# Patient Record
Sex: MALE | Race: WHITE | NOT HISPANIC OR LATINO | Employment: OTHER | URBAN - METROPOLITAN AREA
[De-identification: names, ages, dates, MRNs, and addresses within clinical notes are randomized per-mention and may not be internally consistent; named-entity substitution may affect disease eponyms.]

---

## 2021-08-17 ENCOUNTER — APPOINTMENT (OUTPATIENT)
Dept: RADIOLOGY | Facility: MEDICAL CENTER | Age: 76
DRG: 066 | End: 2021-08-17
Attending: EMERGENCY MEDICINE
Payer: COMMERCIAL

## 2021-08-17 ENCOUNTER — APPOINTMENT (OUTPATIENT)
Dept: RADIOLOGY | Facility: MEDICAL CENTER | Age: 76
DRG: 066 | End: 2021-08-17
Attending: HOSPITALIST
Payer: COMMERCIAL

## 2021-08-17 ENCOUNTER — HOSPITAL ENCOUNTER (INPATIENT)
Facility: MEDICAL CENTER | Age: 76
LOS: 2 days | DRG: 066 | End: 2021-08-19
Attending: EMERGENCY MEDICINE | Admitting: HOSPITALIST
Payer: COMMERCIAL

## 2021-08-17 DIAGNOSIS — I10 HYPERTENSION, UNSPECIFIED TYPE: ICD-10-CM

## 2021-08-17 DIAGNOSIS — I25.10 CORONARY ARTERY DISEASE INVOLVING NATIVE HEART, UNSPECIFIED VESSEL OR LESION TYPE, UNSPECIFIED WHETHER ANGINA PRESENT: ICD-10-CM

## 2021-08-17 DIAGNOSIS — I63.9 ACUTE CVA (CEREBROVASCULAR ACCIDENT) (HCC): ICD-10-CM

## 2021-08-17 PROBLEM — I73.9 PAD (PERIPHERAL ARTERY DISEASE) (HCC): Status: ACTIVE | Noted: 2021-08-17

## 2021-08-17 PROBLEM — E78.5 DYSLIPIDEMIA: Status: ACTIVE | Noted: 2021-08-17

## 2021-08-17 PROBLEM — R13.19 OTHER DYSPHAGIA: Status: ACTIVE | Noted: 2021-08-17

## 2021-08-17 PROBLEM — I65.21 STENOSIS OF RIGHT CAROTID ARTERY: Status: ACTIVE | Noted: 2021-08-17

## 2021-08-17 LAB
ABO + RH BLD: NORMAL
ABO GROUP BLD: NORMAL
ALBUMIN SERPL BCP-MCNC: 4.5 G/DL (ref 3.2–4.9)
ALBUMIN/GLOB SERPL: 1.9 G/DL
ALP SERPL-CCNC: 93 U/L (ref 30–99)
ALT SERPL-CCNC: 29 U/L (ref 2–50)
ANION GAP SERPL CALC-SCNC: 12 MMOL/L (ref 7–16)
APTT PPP: 34.8 SEC (ref 24.7–36)
AST SERPL-CCNC: 27 U/L (ref 12–45)
BASOPHILS # BLD AUTO: 0.3 % (ref 0–1.8)
BASOPHILS # BLD: 0.03 K/UL (ref 0–0.12)
BILIRUB SERPL-MCNC: 0.9 MG/DL (ref 0.1–1.5)
BLD GP AB SCN SERPL QL: NORMAL
BUN SERPL-MCNC: 11 MG/DL (ref 8–22)
CALCIUM SERPL-MCNC: 9.5 MG/DL (ref 8.5–10.5)
CHLORIDE SERPL-SCNC: 102 MMOL/L (ref 96–112)
CO2 SERPL-SCNC: 22 MMOL/L (ref 20–33)
CREAT SERPL-MCNC: 0.59 MG/DL (ref 0.5–1.4)
EKG IMPRESSION: NORMAL
EOSINOPHIL # BLD AUTO: 0.06 K/UL (ref 0–0.51)
EOSINOPHIL NFR BLD: 0.7 % (ref 0–6.9)
ERYTHROCYTE [DISTWIDTH] IN BLOOD BY AUTOMATED COUNT: 41.7 FL (ref 35.9–50)
EST. AVERAGE GLUCOSE BLD GHB EST-MCNC: 108 MG/DL
GLOBULIN SER CALC-MCNC: 2.4 G/DL (ref 1.9–3.5)
GLUCOSE SERPL-MCNC: 116 MG/DL (ref 65–99)
HBA1C MFR BLD: 5.4 % (ref 4–5.6)
HCT VFR BLD AUTO: 43.4 % (ref 42–52)
HGB BLD-MCNC: 15.4 G/DL (ref 14–18)
IMM GRANULOCYTES # BLD AUTO: 0.04 K/UL (ref 0–0.11)
IMM GRANULOCYTES NFR BLD AUTO: 0.5 % (ref 0–0.9)
INR PPP: 1.08 (ref 0.87–1.13)
LYMPHOCYTES # BLD AUTO: 1.79 K/UL (ref 1–4.8)
LYMPHOCYTES NFR BLD: 20.3 % (ref 22–41)
MAGNESIUM SERPL-MCNC: 1.8 MG/DL (ref 1.5–2.5)
MCH RBC QN AUTO: 31.8 PG (ref 27–33)
MCHC RBC AUTO-ENTMCNC: 35.5 G/DL (ref 33.7–35.3)
MCV RBC AUTO: 89.5 FL (ref 81.4–97.8)
MONOCYTES # BLD AUTO: 0.78 K/UL (ref 0–0.85)
MONOCYTES NFR BLD AUTO: 8.8 % (ref 0–13.4)
NEUTROPHILS # BLD AUTO: 6.13 K/UL (ref 1.82–7.42)
NEUTROPHILS NFR BLD: 69.4 % (ref 44–72)
NRBC # BLD AUTO: 0 K/UL
NRBC BLD-RTO: 0 /100 WBC
PLATELET # BLD AUTO: 210 K/UL (ref 164–446)
PMV BLD AUTO: 9.4 FL (ref 9–12.9)
POTASSIUM SERPL-SCNC: 4.2 MMOL/L (ref 3.6–5.5)
PROT SERPL-MCNC: 6.9 G/DL (ref 6–8.2)
PROTHROMBIN TIME: 13.7 SEC (ref 12–14.6)
RBC # BLD AUTO: 4.85 M/UL (ref 4.7–6.1)
RH BLD: NORMAL
SODIUM SERPL-SCNC: 136 MMOL/L (ref 135–145)
TROPONIN T SERPL-MCNC: 14 NG/L (ref 6–19)
WBC # BLD AUTO: 8.8 K/UL (ref 4.8–10.8)

## 2021-08-17 PROCEDURE — 70450 CT HEAD/BRAIN W/O DYE: CPT

## 2021-08-17 PROCEDURE — 86850 RBC ANTIBODY SCREEN: CPT

## 2021-08-17 PROCEDURE — 86900 BLOOD TYPING SEROLOGIC ABO: CPT

## 2021-08-17 PROCEDURE — 700105 HCHG RX REV CODE 258: Performed by: HOSPITALIST

## 2021-08-17 PROCEDURE — 83735 ASSAY OF MAGNESIUM: CPT

## 2021-08-17 PROCEDURE — 70551 MRI BRAIN STEM W/O DYE: CPT

## 2021-08-17 PROCEDURE — 84484 ASSAY OF TROPONIN QUANT: CPT

## 2021-08-17 PROCEDURE — 93005 ELECTROCARDIOGRAM TRACING: CPT | Performed by: EMERGENCY MEDICINE

## 2021-08-17 PROCEDURE — 700102 HCHG RX REV CODE 250 W/ 637 OVERRIDE(OP): Performed by: HOSPITALIST

## 2021-08-17 PROCEDURE — 85730 THROMBOPLASTIN TIME PARTIAL: CPT

## 2021-08-17 PROCEDURE — 86901 BLOOD TYPING SEROLOGIC RH(D): CPT

## 2021-08-17 PROCEDURE — 83036 HEMOGLOBIN GLYCOSYLATED A1C: CPT

## 2021-08-17 PROCEDURE — A9270 NON-COVERED ITEM OR SERVICE: HCPCS | Performed by: HOSPITALIST

## 2021-08-17 PROCEDURE — 99285 EMERGENCY DEPT VISIT HI MDM: CPT

## 2021-08-17 PROCEDURE — 700117 HCHG RX CONTRAST REV CODE 255: Performed by: EMERGENCY MEDICINE

## 2021-08-17 PROCEDURE — 70498 CT ANGIOGRAPHY NECK: CPT

## 2021-08-17 PROCEDURE — 99223 1ST HOSP IP/OBS HIGH 75: CPT | Performed by: PSYCHIATRY & NEUROLOGY

## 2021-08-17 PROCEDURE — 99223 1ST HOSP IP/OBS HIGH 75: CPT | Mod: AI | Performed by: HOSPITALIST

## 2021-08-17 PROCEDURE — 36415 COLL VENOUS BLD VENIPUNCTURE: CPT

## 2021-08-17 PROCEDURE — 80053 COMPREHEN METABOLIC PANEL: CPT

## 2021-08-17 PROCEDURE — 85610 PROTHROMBIN TIME: CPT

## 2021-08-17 PROCEDURE — 0042T CT-CEREBRAL PERFUSION ANALYSIS: CPT

## 2021-08-17 PROCEDURE — 70496 CT ANGIOGRAPHY HEAD: CPT

## 2021-08-17 PROCEDURE — 71045 X-RAY EXAM CHEST 1 VIEW: CPT

## 2021-08-17 PROCEDURE — 770020 HCHG ROOM/CARE - TELE (206)

## 2021-08-17 PROCEDURE — 85025 COMPLETE CBC W/AUTO DIFF WBC: CPT

## 2021-08-17 RX ORDER — ALBUTEROL SULFATE 90 UG/1
2 AEROSOL, METERED RESPIRATORY (INHALATION) EVERY 6 HOURS PRN
COMMUNITY

## 2021-08-17 RX ORDER — CLOPIDOGREL BISULFATE 75 MG/1
75 TABLET ORAL DAILY
Status: ON HOLD | COMMUNITY
End: 2021-08-19

## 2021-08-17 RX ORDER — ATORVASTATIN CALCIUM 20 MG/1
40 TABLET, FILM COATED ORAL NIGHTLY
Status: DISCONTINUED | OUTPATIENT
Start: 2021-08-17 | End: 2021-08-17

## 2021-08-17 RX ORDER — NICOTINE 21 MG/24HR
14 PATCH, TRANSDERMAL 24 HOURS TRANSDERMAL
Status: DISCONTINUED | OUTPATIENT
Start: 2021-08-17 | End: 2021-08-19 | Stop reason: HOSPADM

## 2021-08-17 RX ORDER — CLOPIDOGREL BISULFATE 75 MG/1
75 TABLET ORAL DAILY
Status: DISCONTINUED | OUTPATIENT
Start: 2021-08-17 | End: 2021-08-18

## 2021-08-17 RX ORDER — ONDANSETRON 2 MG/ML
4 INJECTION INTRAMUSCULAR; INTRAVENOUS EVERY 4 HOURS PRN
Status: DISCONTINUED | OUTPATIENT
Start: 2021-08-17 | End: 2021-08-19 | Stop reason: HOSPADM

## 2021-08-17 RX ORDER — METOPROLOL SUCCINATE 200 MG/1
200 TABLET, EXTENDED RELEASE ORAL EVERY MORNING
COMMUNITY

## 2021-08-17 RX ORDER — FAMOTIDINE 20 MG/1
20 TABLET, FILM COATED ORAL EVERY EVENING
COMMUNITY

## 2021-08-17 RX ORDER — AMOXICILLIN 250 MG
2 CAPSULE ORAL 2 TIMES DAILY
Status: DISCONTINUED | OUTPATIENT
Start: 2021-08-17 | End: 2021-08-19 | Stop reason: HOSPADM

## 2021-08-17 RX ORDER — LABETALOL HYDROCHLORIDE 5 MG/ML
10 INJECTION, SOLUTION INTRAVENOUS
Status: DISCONTINUED | OUTPATIENT
Start: 2021-08-17 | End: 2021-08-19 | Stop reason: HOSPADM

## 2021-08-17 RX ORDER — HYDRALAZINE HYDROCHLORIDE 20 MG/ML
10 INJECTION INTRAMUSCULAR; INTRAVENOUS
Status: DISCONTINUED | OUTPATIENT
Start: 2021-08-17 | End: 2021-08-19 | Stop reason: HOSPADM

## 2021-08-17 RX ORDER — ASPIRIN 81 MG/1
81 TABLET, CHEWABLE ORAL DAILY
Status: DISCONTINUED | OUTPATIENT
Start: 2021-08-18 | End: 2021-08-19 | Stop reason: HOSPADM

## 2021-08-17 RX ORDER — BISACODYL 10 MG
10 SUPPOSITORY, RECTAL RECTAL
Status: DISCONTINUED | OUTPATIENT
Start: 2021-08-17 | End: 2021-08-19 | Stop reason: HOSPADM

## 2021-08-17 RX ORDER — SODIUM CHLORIDE 9 MG/ML
INJECTION, SOLUTION INTRAVENOUS CONTINUOUS
Status: ACTIVE | OUTPATIENT
Start: 2021-08-17 | End: 2021-08-18

## 2021-08-17 RX ORDER — ASPIRIN 325 MG
325 TABLET ORAL DAILY
Status: DISCONTINUED | OUTPATIENT
Start: 2021-08-17 | End: 2021-08-17

## 2021-08-17 RX ORDER — ACETAMINOPHEN 325 MG/1
650 TABLET ORAL EVERY 6 HOURS PRN
Status: DISCONTINUED | OUTPATIENT
Start: 2021-08-17 | End: 2021-08-19 | Stop reason: HOSPADM

## 2021-08-17 RX ORDER — BUDESONIDE AND FORMOTEROL FUMARATE DIHYDRATE 160; 4.5 UG/1; UG/1
2 AEROSOL RESPIRATORY (INHALATION)
Status: DISCONTINUED | OUTPATIENT
Start: 2021-08-17 | End: 2021-08-19 | Stop reason: HOSPADM

## 2021-08-17 RX ORDER — ASPIRIN 300 MG/1
300 SUPPOSITORY RECTAL DAILY
Status: DISCONTINUED | OUTPATIENT
Start: 2021-08-17 | End: 2021-08-17

## 2021-08-17 RX ORDER — BUDESONIDE AND FORMOTEROL FUMARATE DIHYDRATE 160; 4.5 UG/1; UG/1
2 AEROSOL RESPIRATORY (INHALATION) 2 TIMES DAILY
COMMUNITY

## 2021-08-17 RX ORDER — ATORVASTATIN CALCIUM 40 MG/1
40 TABLET, FILM COATED ORAL NIGHTLY
COMMUNITY

## 2021-08-17 RX ORDER — ONDANSETRON 4 MG/1
4 TABLET, ORALLY DISINTEGRATING ORAL EVERY 4 HOURS PRN
Status: DISCONTINUED | OUTPATIENT
Start: 2021-08-17 | End: 2021-08-19 | Stop reason: HOSPADM

## 2021-08-17 RX ORDER — ALBUTEROL SULFATE 90 UG/1
2 AEROSOL, METERED RESPIRATORY (INHALATION)
Status: DISCONTINUED | OUTPATIENT
Start: 2021-08-17 | End: 2021-08-19 | Stop reason: HOSPADM

## 2021-08-17 RX ORDER — ASPIRIN 81 MG/1
324 TABLET, CHEWABLE ORAL DAILY
Status: DISCONTINUED | OUTPATIENT
Start: 2021-08-17 | End: 2021-08-17

## 2021-08-17 RX ORDER — GUAIFENESIN/DEXTROMETHORPHAN 100-10MG/5
10 SYRUP ORAL EVERY 6 HOURS PRN
Status: DISCONTINUED | OUTPATIENT
Start: 2021-08-17 | End: 2021-08-19 | Stop reason: HOSPADM

## 2021-08-17 RX ORDER — FAMOTIDINE 20 MG/1
20 TABLET, FILM COATED ORAL EVERY EVENING
Status: DISCONTINUED | OUTPATIENT
Start: 2021-08-17 | End: 2021-08-19 | Stop reason: HOSPADM

## 2021-08-17 RX ORDER — ASPIRIN 81 MG/1
162 TABLET, CHEWABLE ORAL ONCE
Status: ACTIVE | OUTPATIENT
Start: 2021-08-17 | End: 2021-08-18

## 2021-08-17 RX ORDER — ATORVASTATIN CALCIUM 80 MG/1
80 TABLET, FILM COATED ORAL EVERY EVENING
Status: DISCONTINUED | OUTPATIENT
Start: 2021-08-17 | End: 2021-08-19 | Stop reason: HOSPADM

## 2021-08-17 RX ORDER — POLYETHYLENE GLYCOL 3350 17 G/17G
1 POWDER, FOR SOLUTION ORAL
Status: DISCONTINUED | OUTPATIENT
Start: 2021-08-17 | End: 2021-08-19 | Stop reason: HOSPADM

## 2021-08-17 RX ADMIN — IOHEXOL 40 ML: 350 INJECTION, SOLUTION INTRAVENOUS at 12:17

## 2021-08-17 RX ADMIN — NICOTINE POLACRILEX 2 MG: 2 GUM, CHEWING BUCCAL at 17:38

## 2021-08-17 RX ADMIN — ASPIRIN 300 MG: 300 SUPPOSITORY RECTAL at 14:01

## 2021-08-17 RX ADMIN — ATORVASTATIN CALCIUM 80 MG: 80 TABLET, FILM COATED ORAL at 17:41

## 2021-08-17 RX ADMIN — SODIUM CHLORIDE: 9 INJECTION, SOLUTION INTRAVENOUS at 17:38

## 2021-08-17 RX ADMIN — IOHEXOL 90 ML: 350 INJECTION, SOLUTION INTRAVENOUS at 12:15

## 2021-08-17 ASSESSMENT — ENCOUNTER SYMPTOMS
VOMITING: 0
ORTHOPNEA: 0
CHILLS: 0
ABDOMINAL PAIN: 1
DOUBLE VISION: 0
SORE THROAT: 0
SHORTNESS OF BREATH: 0
FEVER: 0
DIZZINESS: 0
SPEECH CHANGE: 1
HEARTBURN: 0
COUGH: 0
NERVOUS/ANXIOUS: 0
SPUTUM PRODUCTION: 0
DEPRESSION: 0
NAUSEA: 0
PALPITATIONS: 0
BLURRED VISION: 1
HEMOPTYSIS: 0
MYALGIAS: 0

## 2021-08-17 ASSESSMENT — LIFESTYLE VARIABLES: DO YOU DRINK ALCOHOL: NO

## 2021-08-17 ASSESSMENT — PAIN DESCRIPTION - PAIN TYPE: TYPE: ACUTE PAIN

## 2021-08-17 NOTE — ED PROVIDER NOTES
ED Provider Note    CHIEF COMPLAINT  Chief Complaint   Patient presents with   • Possible Stroke     pt bib ems from home, Last known well at 2300, woke up at 600am haiving expressive aphasia and difficulty swallowing. has bgl of 129. pt on plavix       HPI  Juan Khanna is a 76 y.o. male who presents with difficulty with speaking.  The patient felt normal went to bed last night.  He awoke this morning was having hard time saying words.  He does have an occipital headache.  Does not have any visual changes.  He states he takes Plavix for peripheral arterial disease.  He has not had any associated nausea or vomiting.  Has not any recent fevers and is unaware of any sick contacts.  He does not have any paresthesias nor functional loss of his extremities.    REVIEW OF SYSTEMS  See HPI for further details. All other systems are negative.     PAST MEDICAL HISTORY  No past medical history on file.    FAMILY HISTORY  [unfilled]    SOCIAL HISTORY  Social History     Socioeconomic History   • Marital status: Not on file     Spouse name: Not on file   • Number of children: Not on file   • Years of education: Not on file   • Highest education level: Not on file   Occupational History   • Not on file   Tobacco Use   • Smoking status: Not on file   Substance and Sexual Activity   • Alcohol use: Not on file   • Drug use: Not on file   • Sexual activity: Not on file   Other Topics Concern   • Not on file   Social History Narrative   • Not on file     Social Determinants of Health     Financial Resource Strain:    • Difficulty of Paying Living Expenses:    Food Insecurity:    • Worried About Running Out of Food in the Last Year:    • Ran Out of Food in the Last Year:    Transportation Needs:    • Lack of Transportation (Medical):    • Lack of Transportation (Non-Medical):    Physical Activity:    • Days of Exercise per Week:    • Minutes of Exercise per Session:    Stress:    • Feeling of Stress :    Social Connections:    • Frequency  "of Communication with Friends and Family:    • Frequency of Social Gatherings with Friends and Family:    • Attends S[employer] Services:    • Active Member of Clubs or Organizations:    • Attends Club or Organization Meetings:    • Marital Status:    Intimate Partner Violence:    • Fear of Current or Ex-Partner:    • Emotionally Abused:    • Physically Abused:    • Sexually Abused:        SURGICAL HISTORY  No past surgical history on file.    CURRENT MEDICATIONS  Home Medications    **Home medications have not yet been reviewed for this encounter**         ALLERGIES  No Known Allergies    PHYSICAL EXAM  VITAL SIGNS: BP (!) 174/79   Pulse 83   Temp 36.8 °C (98.2 °F) (Temporal)   Resp (!) 25   Ht 1.727 m (5' 8\")   Wt 68.1 kg (150 lb 2.1 oz)   SpO2 98%   BMI 22.83 kg/m²       Constitutional: Mild acute distress, Non-toxic appearance.   HENT: Normocephalic, Atraumatic, Bilateral external ears normal, Oropharynx moist, No oral exudates, Nose normal.   Eyes: PERRLA, EOMI, Conjunctiva normal, No discharge.   Neck: Normal range of motion, No tenderness, Supple, No stridor.   Lymphatic: No lymphadenopathy noted.   Cardiovascular: Normal heart rate, Normal rhythm, No murmurs, No rubs, No gallops.   Thorax & Lungs: Normal breath sounds, No respiratory distress, No wheezing, No chest tenderness.   Abdomen: Bowel sounds normal, Soft, No tenderness, No masses, No pulsatile masses.   Skin: Warm, Dry, No erythema, No rash.   Back: No tenderness, No CVA tenderness.   Genitalia: External genitalia appear normal, No masses or lesions. No discharge.   Rectal: Normal appearance, Normal sphincter tone. No external or internal lesions noted. Stool is normal color and heme negative.   Extremities: Intact distal pulses, No edema, No tenderness, No cyanosis, No clubbing.    Neurologic: Alert & oriented x 3, the patient does have an expressive aphasia and his NIH stroke scale is 2 please see the stroke scale chart   psychiatric: Affect " normal, Judgment normal, Mood normal.     Results for orders placed or performed during the hospital encounter of 08/17/21   CBC WITH DIFFERENTIAL   Result Value Ref Range    WBC 8.8 4.8 - 10.8 K/uL    RBC 4.85 4.70 - 6.10 M/uL    Hemoglobin 15.4 14.0 - 18.0 g/dL    Hematocrit 43.4 42.0 - 52.0 %    MCV 89.5 81.4 - 97.8 fL    MCH 31.8 27.0 - 33.0 pg    MCHC 35.5 (H) 33.7 - 35.3 g/dL    RDW 41.7 35.9 - 50.0 fL    Platelet Count 210 164 - 446 K/uL    MPV 9.4 9.0 - 12.9 fL    Neutrophils-Polys 69.40 44.00 - 72.00 %    Lymphocytes 20.30 (L) 22.00 - 41.00 %    Monocytes 8.80 0.00 - 13.40 %    Eosinophils 0.70 0.00 - 6.90 %    Basophils 0.30 0.00 - 1.80 %    Immature Granulocytes 0.50 0.00 - 0.90 %    Nucleated RBC 0.00 /100 WBC    Neutrophils (Absolute) 6.13 1.82 - 7.42 K/uL    Lymphs (Absolute) 1.79 1.00 - 4.80 K/uL    Monos (Absolute) 0.78 0.00 - 0.85 K/uL    Eos (Absolute) 0.06 0.00 - 0.51 K/uL    Baso (Absolute) 0.03 0.00 - 0.12 K/uL    Immature Granulocytes (abs) 0.04 0.00 - 0.11 K/uL    NRBC (Absolute) 0.00 K/uL   COMP METABOLIC PANEL   Result Value Ref Range    Sodium 136 135 - 145 mmol/L    Potassium 4.2 3.6 - 5.5 mmol/L    Chloride 102 96 - 112 mmol/L    Co2 22 20 - 33 mmol/L    Anion Gap 12.0 7.0 - 16.0    Glucose 116 (H) 65 - 99 mg/dL    Bun 11 8 - 22 mg/dL    Creatinine 0.59 0.50 - 1.40 mg/dL    Calcium 9.5 8.5 - 10.5 mg/dL    AST(SGOT) 27 12 - 45 U/L    ALT(SGPT) 29 2 - 50 U/L    Alkaline Phosphatase 93 30 - 99 U/L    Total Bilirubin 0.9 0.1 - 1.5 mg/dL    Albumin 4.5 3.2 - 4.9 g/dL    Total Protein 6.9 6.0 - 8.2 g/dL    Globulin 2.4 1.9 - 3.5 g/dL    A-G Ratio 1.9 g/dL   PROTHROMBIN TIME   Result Value Ref Range    PT 13.7 12.0 - 14.6 sec    INR 1.08 0.87 - 1.13   APTT   Result Value Ref Range    APTT 34.8 24.7 - 36.0 sec   TROPONIN   Result Value Ref Range    Troponin T 14 6 - 19 ng/L   ESTIMATED GFR   Result Value Ref Range    GFR If African American >60 >60 mL/min/1.73 m 2    GFR If Non African American  >60 >60 mL/min/1.73 m 2   EKG (NOW)   Result Value Ref Range    Report       [facility] Emergency Dept.    Test Date:  2021  Pt Name:    MAYO HERRERA                  Department: ER  MRN:        [id number]                      Room:        22  Gender:     Male                         Technician: [id number]  :        1945                   Requested By:LOVE GARCIA  Order #:    [id number]                    Reading MD:    Measurements  Intervals                                Axis  Rate:       76                           P:          88  UT:         126                          QRS:        -4  QRSD:       94                           T:          53  QT:         368  QTc:        414    Interpretive Statements  SINUS RHYTHM  No previous ECG available for comparison         RADIOLOGY/PROCEDURES  CT-CEREBRAL PERFUSION ANALYSIS   Final Result      1.  Cerebral blood flow less than 30% likely representing completed infarct = 0 mL.      2.  T Max more than 6 seconds likely representing combination of completed infarct and ischemia = 0 mL.      3.  Mismatched volume likely representing ischemic brain/penumbra = None      4.  Please note that the cerebral perfusion was performed on the limited brain tissue around the basal ganglia region. Infarct/ischemia outside the CT perfusion sections can be missed in this study.      CT-CTA NECK WITH & W/O-POST PROCESSING   Final Result      1.  Greater than 70% stenosis of the right internal carotid artery at the origin and within approximately 1 cm of the origin.      2.  50% or less stenosis at the origin of the left internal carotid artery.      3.  No common carotid artery stenosis.      4.  Patent bilateral vertebral arteries.      CT-CTA HEAD WITH & W/O-POST PROCESS   Final Result      No thrombosis is seen within the [place].      CT-HEAD W/O   Final Result      1.  No acute intracranial abnormality is identified.   2.  Atrophy   3.  There  are periventricular and subcortical white matter changes present.  This finding is nonspecific and could be from previous small vessel ischemia, demyelination, or gliosis.      DX-CHEST-PORTABLE (1 VIEW)    (Results Pending)         COURSE & MEDICAL DECISION MAKING  Pertinent Labs & Imaging studies reviewed. (See chart for details)  This a 76-year-old male who presents to the emergency department with expressive aphasia.  Suspect this is from a small vessel stroke.  CT scan does not show any evidence of a hemorrhage.  Furthermore there is no large vessel injury.  He does have some significant stenosis noted in the right internal carotid artery.  The patient's exam remains unchanged on repeat exam.  Neurology has been involved and the patient will receive aspirin after noting there is no hemorrhage on the CT scan.  Metabolically there is no derangement that could cause his presenting symptoms.  Clinically not appreciate any evidence of an infection.  I suspect this is from a CVA.  The patient be admitted to the hospital for medical management as he is not a candidate for thrombolytics with an NIH stroke scale of 2.    FINAL IMPRESSION  1.  CVA  2.  Critical care time 30 minutes    Disposition  The patient will be admitted in guarded condition         Electronically signed by: Andrey Ramsey M.D., 8/17/2021 12:33 PM

## 2021-08-17 NOTE — ED TRIAGE NOTES
Chief Complaint   Patient presents with   • Possible Stroke     pt bib ems from home, Last known well at 2300, woke up at 600am haiving expressive aphasia and difficulty swallowing. has bgl of 129. pt on plavix     Also reports head ache.. has NIHSS of 1 by ERP (dr. Ramsey)

## 2021-08-17 NOTE — ASSESSMENT & PLAN NOTE
Discussed with vascular surgery Dr. Klein which will be evaluating the patient.   Continue ASA/Plavix/statin

## 2021-08-17 NOTE — ASSESSMENT & PLAN NOTE
Patient presented to the ER with expressive aphasia when he woke up at 6 AM.   Last normal 2300 on 8/16  CT head no acute intracranial abnormality, CTA head did not show any thrombosis within the [place] , CTA Neck showed Greater than 70% stenosis of the right internal carotid artery at the origin and within approximately 1 cm of the origin.     Plan:  -Neurology Dr Blackmon evaluated, neurochecks every 4 hours, continue aspirin 162 mg p.o. now followed by 81 mg qd; plavix and statin  - echo, mri brain w/o contrast  Pt/OT/Speech

## 2021-08-17 NOTE — H&P
Hospital Medicine History & Physical Note    Date of Service  8/17/2021    Primary Care Physician  No primary care provider on file.    Consultants  neurology    Specialist Names: Dr Blackmon    Code Status  No Order    Chief Complaint  Chief Complaint   Patient presents with   • Possible Stroke     pt bib ems from home, Last known well at 2300, woke up at 600am haiving expressive aphasia and difficulty swallowing. has bgl of 129. pt on plavix       History of Presenting Illness  Juan Khanna is a 76 y.o. male with a past medical history significant for hypertension, dyslipidemia, peripheral vascular disease, CAD status post stent in 2010 pain to the ER on 8/70/2021 after he was noted to have expressive aphasia along with difficulty swallowing.    Patient stated that when he woke up in the morning at 6 AM he had difficulty expressing words, but is able to understand.  Work-up in ER, CT head no acute intracranial abnormality, CTA head did not show any thrombosis within the [place] , CTA Neck showed Greater than 70% stenosis of the right internal carotid artery at the origin and within approximately 1 cm of the origin.     Neurology was consulted, who came and evaluated the patient.  Patient is currently on aspirin plus Plavix plus statin.  Pending echocardiogram/MRI of brain. vascular surgeon Ernie consulted and will be evaluating the patient.    I discussed the plan of care with patient and ERP    Review of Systems  Review of Systems   Constitutional: Positive for malaise/fatigue. Negative for chills and fever.   HENT: Negative for congestion and sore throat.    Eyes: Positive for blurred vision. Negative for double vision.   Respiratory: Negative for cough, hemoptysis, sputum production and shortness of breath.    Cardiovascular: Negative for chest pain, palpitations and orthopnea.   Gastrointestinal: Positive for abdominal pain. Negative for heartburn, nausea and vomiting.   Musculoskeletal: Negative for  myalgias.   Neurological: Positive for speech change. Negative for dizziness.        Aphasia   Psychiatric/Behavioral: Negative for depression. The patient is not nervous/anxious.        Past Medical History   has a past medical history of COPD (chronic obstructive pulmonary disease) (HCC) and High cholesterol.    Surgical History   has a past surgical history that includes other cardiac surgery.     Family History  family history is not on file.   Family history reviewed with patient. There is no family history that is pertinent to the chief complaint.     Social History   reports that he has been smoking. He has never used smokeless tobacco. He reports current drug use. He reports that he does not drink alcohol.    Allergies  No Known Allergies    Medications  None       Physical Exam  Temp:  [36.8 °C (98.2 °F)] 36.8 °C (98.2 °F)  Pulse:  [82-83] 83  Resp:  [16-20] 20  BP: (158-174)/(79-80) 174/79  SpO2:  [98 %-100 %] 98 %    Physical Exam  Vitals and nursing note reviewed.   Constitutional:       Appearance: Normal appearance.   HENT:      Head: Normocephalic and atraumatic.   Eyes:      Extraocular Movements: Extraocular movements intact.   Cardiovascular:      Rate and Rhythm: Normal rate.      Pulses: Normal pulses.   Pulmonary:      Effort: Pulmonary effort is normal. No respiratory distress.      Breath sounds: Normal breath sounds.   Abdominal:      General: Bowel sounds are normal. There is no distension.      Palpations: Abdomen is soft.   Musculoskeletal:      Cervical back: Neck supple.      Right lower leg: No edema.      Left lower leg: No edema.   Skin:     General: Skin is warm.      Coloration: Skin is not jaundiced.   Neurological:      Mental Status: He is alert and oriented to person, place, and time.      Cranial Nerves: No cranial nerve deficit.      Comments: aphasia   Psychiatric:         Mood and Affect: Mood normal.         Laboratory:  Recent Labs     08/17/21  1221   WBC 8.8   RBC 4.85    HEMOGLOBIN 15.4   HEMATOCRIT 43.4   MCV 89.5   MCH 31.8   MCHC 35.5*   RDW 41.7   PLATELETCT 210   MPV 9.4     Recent Labs     08/17/21  1221   SODIUM 136   POTASSIUM 4.2   CHLORIDE 102   CO2 22   GLUCOSE 116*   BUN 11   CREATININE 0.59   CALCIUM 9.5     Recent Labs     08/17/21  1221   ALTSGPT 29   ASTSGOT 27   ALKPHOSPHAT 93   TBILIRUBIN 0.9   GLUCOSE 116*     Recent Labs     08/17/21  1221   APTT 34.8   INR 1.08     No results for input(s): NTPROBNP in the last 72 hours.      Recent Labs     08/17/21  1221   TROPONINT 14       Imaging:  DX-CHEST-PORTABLE (1 VIEW)   Final Result      No acute cardiac or pulmonary abnormality is noted.      CT-CEREBRAL PERFUSION ANALYSIS   Final Result      1.  Cerebral blood flow less than 30% likely representing completed infarct = 0 mL.      2.  T Max more than 6 seconds likely representing combination of completed infarct and ischemia = 0 mL.      3.  Mismatched volume likely representing ischemic brain/penumbra = None      4.  Please note that the cerebral perfusion was performed on the limited brain tissue around the basal ganglia region. Infarct/ischemia outside the CT perfusion sections can be missed in this study.      CT-CTA NECK WITH & W/O-POST PROCESSING   Final Result      1.  Greater than 70% stenosis of the right internal carotid artery at the origin and within approximately 1 cm of the origin.      2.  50% or less stenosis at the origin of the left internal carotid artery.      3.  No common carotid artery stenosis.      4.  Patent bilateral vertebral arteries.      CT-CTA HEAD WITH & W/O-POST PROCESS   Final Result      No thrombosis is seen within the [place].      CT-HEAD W/O   Final Result      1.  No acute intracranial abnormality is identified.   2.  Atrophy   3.  There are periventricular and subcortical white matter changes present.  This finding is nonspecific and could be from previous small vessel ischemia, demyelination, or gliosis.           X-Ray:  I have personally reviewed the images and compared with prior images.    Assessment/Plan:  I anticipate this patient will require at least two midnights for appropriate medical management, necessitating inpatient admission.  Stenosis of right carotid artery  Assessment & Plan  Discussed with vascular surgery Dr. Klein which will be evaluating the patient.   Continue ASA/Plavix/statin    Stroke (Formerly [facility])  Assessment & Plan  Patient presented to the ER with expressive aphasia when he woke up at 6 AM.   Last normal 2300 on 8/16  CT head no acute intracranial abnormality, CTA head did not show any thrombosis within the [place] , CTA Neck showed Greater than 70% stenosis of the right internal carotid artery at the origin and within approximately 1 cm of the origin.     Plan:  -Neurology Dr Blackmon evaluated, neurochecks every 4 hours, continue aspirin 162 mg p.o. now followed by 81 mg qd; plavix and statin  -pending Glyco+ Lipid panel   - echo, mri brain w/o contrast  Pt/OT/Speech     CAD (coronary artery disease)  Assessment & Plan  CAD s/p stent in 2010, continue Plavix, statin  Will hold BB for now     Other dysphagia  Assessment & Plan  Bedside nursing swallow eval followed by speech to evaluate swallow    PAD (peripheral artery disease) (Formerly [facility])  Assessment & Plan  Continue Plavix and statin    Dyslipidemia  Assessment & Plan  On statin , will continue     Hypertension  Assessment & Plan  On metoprolol as an outpatient, will hold for permissive hypertension.      No new Assessment & Plan notes have been filed under this hospital service since the last note was generated.  Service: Hospital Medicine      VTE prophylaxis: enoxaparin ppx

## 2021-08-17 NOTE — CONSULTS
Neurology STROKE CODE H&P  Neurohospitalist Service, [facility]    Referring Physician: Andrey Ramsey M.D.    STROKE CODE: Difficulty talking    To obtain the most accurate data regarding the time called, and time patient seen, refer to the stroke run-sheet and chart.  For time of CT, refer to the radiology report. See A&P below for TPA Decision and door to needle time if and when applicable.    HPI: Juan Khanna is a 76 year old with hypertension, hyperlipidemia, CAD with stent placement in 2010, presenting with language difficulties.  He reports that he went to bed normal around 2300.  This AM, when he woke up, he thought he had difficulties with swallowing, and noted that he was unable to get words out.  He denies any other focal symptoms such as lateralizing weakness, parethesias or visual changes.  He did have a occipital headache.  He eventually came to ER for evaluation, on arrival NIHSS 1 for isolated expressive aphasia.  Stroke protocol CT without evidence of hemorrhage, large vessel occlusion, or large stroke.  On ROS, he reports no infectious prodrome, no constitutional symptoms.  He reports compliance with medications which includes plavix, statin, metoprolol, famotidine.  He continues to smoke about 5 cigarettes daily, denies drinking or recreational drug use.    Review of systems: In addition to what is detailed in the HPI above, all other systems reviewed and are negative.    Past Medical History:   Hypertension, hyperlipidemia, CAD s/p stent      FHx:  No family history of early strokes, coronary artery disease in father, paternal grandfather, paternal uncle.    SHx:  Active smoker, 5 cigarettes daily, denies EtOH and recreational/illicit drugs    Allergies:  No known drug allergy    Medications:  No current facility-administered medications for this encounter.  No current outpatient medications on file.    Physical Examination:    Vitals:    08/17/21 1207 08/17/21 1220  "08/17/21 1223   BP: 158/80 (!) 174/79    Pulse: 82 83    Resp: 16 20    Temp:  36.8 °C (98.2 °F)    TempSrc:  Temporal    SpO2: 100% 98%    Weight:   68.1 kg (150 lb 2.1 oz)   Height:   1.727 m (5' 8\")         General: Patient is awake and in no acute distress  Eye: Examination of optic disks not indicated at this time given acuity of consult  Neck: There is normal range of motion  CV: Regular rate   Extremities:  Clear, dry, intact, without peripheral edema    NEUROLOGICAL EXAM:     Mental status: Awake, alert and fully oriented  Speech and language: Speech is delayed with some naming and repetition difficulties.  He is able to follow midline and distal commands  Cranial nerve exam: . Visual fields are full. There is no nystagmus. Extraocular muscles are intact. Face is symmetric. Sensation in the face is intact to light touch. Palate elevates symmetrically. Tongue is midline.  Motor exam: There is sustained antigravity with no downward drift in bilateral arms and legs.  one is normal. No abnormal movements were seen on exam.  Sensory exam:  Reacts to tactile in all 4 distal extremities, there is no neglect to double stim..  Coordination: No ataxia on bilateral finger-to-nose testing.  Gait: Deferred due to patient preference.    NIHSS: National Institutes of Health Stroke Scale    [0] 1a:Level of Consciousness    0-alert 1-drowsy   2-stupor   3-coma  [0] 1b:LOC Questions                  0-both  1-one      2-neither  [0] 1c:LOC Commands                   0-both  1-one      2-neither  [0] 2: Best Gaze                     0-nl    1-partial  2-forced  [0] 3: Visual Fields                   0-nl    1-partial  2-complete 3-bilat  [0] 4: Facial Paresis                0-nl    1-minor    2-partial  3-full  MOTOR                       0-nl  [0] 5: Right Arm           1-drift  [0] 6: Left Arm             2-some effort vs gravity  [0] 7: Right Leg           3-no effort vs gravity  [0] 8: Left Leg             4-no movement    "                          x-untestable  [0] 9: Limb Ataxia                    0-abs   1-1_limb   2-2+_limbs       x-untestable  [0] 10:Sensory                        0-nl    1-partial  2-dense  [1] 11:Best Language/Aphasia         0-nl    1-mild/mod 2-severe   3-mute  [0] 12:Dysarthria                     0-nl    1-mild/mod 2-severe       x-untestable  [0] 13:Neglect/Inattention            0-none  1-partial  2-complete  [1] TOTAL    Baseline Modified [place] Scale (MRS): 0 = No symptoms    Objective Data:    Labs:  Lab Results   Component Value Date/Time    PROTHROMBTM 13.7 08/17/2021 12:21 PM    INR 1.08 08/17/2021 12:21 PM      Lab Results   Component Value Date/Time    WBC 8.8 08/17/2021 12:21 PM    RBC 4.85 08/17/2021 12:21 PM    HEMOGLOBIN 15.4 08/17/2021 12:21 PM    HEMATOCRIT 43.4 08/17/2021 12:21 PM    MCV 89.5 08/17/2021 12:21 PM    MCH 31.8 08/17/2021 12:21 PM    MCHC 35.5 (H) 08/17/2021 12:21 PM    MPV 9.4 08/17/2021 12:21 PM    NEUTSPOLYS 69.40 08/17/2021 12:21 PM    LYMPHOCYTES 20.30 (L) 08/17/2021 12:21 PM    MONOCYTES 8.80 08/17/2021 12:21 PM    EOSINOPHILS 0.70 08/17/2021 12:21 PM    BASOPHILS 0.30 08/17/2021 12:21 PM      Lab Results   Component Value Date/Time    SODIUM 136 08/17/2021 12:21 PM    POTASSIUM 4.2 08/17/2021 12:21 PM    CHLORIDE 102 08/17/2021 12:21 PM    CO2 22 08/17/2021 12:21 PM    GLUCOSE 116 (H) 08/17/2021 12:21 PM    BUN 11 08/17/2021 12:21 PM    CREATININE 0.59 08/17/2021 12:21 PM      No results found for: CHOLSTRLTOT, LDL, HDL, TRIGLYCERIDE    Lab Results   Component Value Date/Time    ALKPHOSPHAT 93 08/17/2021 12:21 PM    ASTSGOT 27 08/17/2021 12:21 PM    ALTSGPT 29 08/17/2021 12:21 PM    TBILIRUBIN 0.9 08/17/2021 12:21 PM        Imaging/Testing:    I interpreted and/or reviewed the patient's neuroimaging    DX-CHEST-PORTABLE (1 VIEW)   Final Result      No acute cardiac or pulmonary abnormality is noted.      CT-CEREBRAL PERFUSION ANALYSIS   Final Result      1.  Cerebral  blood flow less than 30% likely representing completed infarct = 0 mL.      2.  T Max more than 6 seconds likely representing combination of completed infarct and ischemia = 0 mL.      3.  Mismatched volume likely representing ischemic brain/penumbra = None      4.  Please note that the cerebral perfusion was performed on the limited brain tissue around the basal ganglia region. Infarct/ischemia outside the CT perfusion sections can be missed in this study.      CT-CTA NECK WITH & W/O-POST PROCESSING   Final Result      1.  Greater than 70% stenosis of the right internal carotid artery at the origin and within approximately 1 cm of the origin.      2.  50% or less stenosis at the origin of the left internal carotid artery.      3.  No common carotid artery stenosis.      4.  Patent bilateral vertebral arteries.      CT-CTA HEAD WITH & W/O-POST PROCESS   Final Result      No thrombosis is seen within the [place].      CT-HEAD W/O   Final Result      1.  No acute intracranial abnormality is identified.   2.  Atrophy   3.  There are periventricular and subcortical white matter changes present.  This finding is nonspecific and could be from previous small vessel ischemia, demyelination, or gliosis.      EC-ECHOCARDIOGRAM COMPLETE W/O CONT    (Results Pending)   MR-BRAIN-W/O    (Results Pending)       Assessment and Plan:  Juan Khanna is a 76 year old man with vascular risk factors, presenting with acute onset expressive aphasia.  He is not a tPA candidate given he is well outside therapeutic time window, and there is no large vessel occlusion amenable to endovascular clot retrieval.  CT angiogram does reveal diffuse atherosclerotic disease, but there does not appear to be any flow-limiting stenoses.  The R carotid stenosis is likely asymptomatic and does not warranted surgical revascularization..   I do suspect he had a stroke, etiology uncertain at this time, may be atheroembolic versus cardioembolic.  Will  admit for stroke diagnostics and risk factor optimization.      Problem list:  1.  Expressive aphasia  2.  Hypertension  3.  Hyperlipidemia  4.  CAD    Recommendations:   - admit, q4h neurochecks, telemetry   - MRI brain without contrast   - allow permissive HTN overnight, ok to restart home anti-HTN tomorrow, titrate PO medications to long-term goal 110-130/60-80   - give ASA 162mg daily, then continue ASA 81mg daily x 90 days   - continue plavix 75mg daily   - stroke labs:  HgbA1c and lipid panel   - continue atorvastatin for LDL goal < 70   - complete embolic workup with TTE and Ziopatch monitoring at discharge   - PT/OT/SLP   - initiate lovenox SQ for DVT ppx   - stroke bridge clinic follow up    Daniel Blackmon MD  Neurohospitalist, [facility]

## 2021-08-17 NOTE — ED NOTES
Neurologist at bedside, pt thoroughly examined at bedside by MD. Per neurologist, pt to have swallow eval and aspirin. Pt tearful, emotional support provided. Pt able to move self in bed, full ROM with all 4 extremities. Pt to edge of bed to urinate, repositions self back in bed.

## 2021-08-17 NOTE — ED NOTES
Pt moved to 57, wheeled to room. Report given to Alessandra RIVAS. Ambulatory to BR with steady gait. Pt to MRI.

## 2021-08-17 NOTE — DISCHARGE PLANNING
[facility] Acute [facility] Transitional Care Coordination    Referral from:  Dr. Villareal  Insurance Provider on Facesheet: No insurance coverage at this time  Potential Rehab Diagnosis:  Strike    Chart review indicates patient may have on going medical management and may have therapy needs to possibly meet inpatient rehab facility criteria with the goal of returning to community.    D/C support: To be determined     Physiatry consultation pended while waiting for additional information.  Expressive aphasia, difficulty swallowing.  Concern for stroke.  Workup ongoing, therapy evals pending as clinically appropriate.  TCC will follow.  Please reach out sooner if PMR consult requested for medical management.      Last Covid test:       Thank you for the referral.

## 2021-08-17 NOTE — ED NOTES
Med rec complete per pt and rx bottles at bedside- reviewed with pt. Meds returned to bedside. No plavix rx bottle at bedside.       Medication Sig   • atorvastatin (LIPITOR) 40 MG Tab Take 40 mg by mouth every evening.   • metoprolol (TOPROL XL) 200 MG XL tablet Take 200 mg by mouth every morning.   • famotidine (PEPCID) 20 MG Tab Take 20 mg by mouth every evening.   • budesonide-formoterol (SYMBICORT) 160-4.5 MCG/ACT Aerosol Inhale 2 Puffs 2 times a day.   • albuterol 108 (90 Base) MCG/ACT Aero Soln inhalation aerosol Inhale 2 Puffs every 6 hours as needed for Shortness of Breath.   • clopidogrel (PLAVIX) 75 MG Tab Take 75 mg by mouth every day.

## 2021-08-18 ENCOUNTER — APPOINTMENT (OUTPATIENT)
Dept: CARDIOLOGY | Facility: MEDICAL CENTER | Age: 76
DRG: 066 | End: 2021-08-18
Attending: HOSPITALIST
Payer: COMMERCIAL

## 2021-08-18 DIAGNOSIS — I63.9 CEREBROVASCULAR ACCIDENT (CVA), UNSPECIFIED MECHANISM (HCC): ICD-10-CM

## 2021-08-18 LAB
ALBUMIN SERPL BCP-MCNC: 4.1 G/DL (ref 3.2–4.9)
ALBUMIN/GLOB SERPL: 2 G/DL
ALP SERPL-CCNC: 81 U/L (ref 30–99)
ALT SERPL-CCNC: 23 U/L (ref 2–50)
AMPHET UR QL SCN: NEGATIVE
ANION GAP SERPL CALC-SCNC: 10 MMOL/L (ref 7–16)
AST SERPL-CCNC: 22 U/L (ref 12–45)
BARBITURATES UR QL SCN: NEGATIVE
BENZODIAZ UR QL SCN: NEGATIVE
BILIRUB SERPL-MCNC: 1.2 MG/DL (ref 0.1–1.5)
BUN SERPL-MCNC: 11 MG/DL (ref 8–22)
BZE UR QL SCN: NEGATIVE
CALCIUM SERPL-MCNC: 8.8 MG/DL (ref 8.5–10.5)
CANNABINOIDS UR QL SCN: POSITIVE
CHLORIDE SERPL-SCNC: 104 MMOL/L (ref 96–112)
CHOLEST SERPL-MCNC: 101 MG/DL (ref 100–199)
CO2 SERPL-SCNC: 23 MMOL/L (ref 20–33)
CREAT SERPL-MCNC: 0.62 MG/DL (ref 0.5–1.4)
ERYTHROCYTE [DISTWIDTH] IN BLOOD BY AUTOMATED COUNT: 42.5 FL (ref 35.9–50)
GLOBULIN SER CALC-MCNC: 2.1 G/DL (ref 1.9–3.5)
GLUCOSE SERPL-MCNC: 84 MG/DL (ref 65–99)
HCT VFR BLD AUTO: 42 % (ref 42–52)
HDLC SERPL-MCNC: 51 MG/DL
HGB BLD-MCNC: 14.5 G/DL (ref 14–18)
LDLC SERPL CALC-MCNC: 36 MG/DL
LV EJECT FRACT  99904: 65
LV EJECT FRACT MOD 2C 99903: 66.3
LV EJECT FRACT MOD 4C 99902: 63.19
LV EJECT FRACT MOD BP 99901: 64.92
MCH RBC QN AUTO: 31.3 PG (ref 27–33)
MCHC RBC AUTO-ENTMCNC: 34.5 G/DL (ref 33.7–35.3)
MCV RBC AUTO: 90.7 FL (ref 81.4–97.8)
METHADONE UR QL SCN: NEGATIVE
OPIATES UR QL SCN: NEGATIVE
OXYCODONE UR QL SCN: NEGATIVE
PCP UR QL SCN: NEGATIVE
PLATELET # BLD AUTO: 176 K/UL (ref 164–446)
PMV BLD AUTO: 9.7 FL (ref 9–12.9)
POTASSIUM SERPL-SCNC: 3.8 MMOL/L (ref 3.6–5.5)
PROPOXYPH UR QL SCN: NEGATIVE
PROT SERPL-MCNC: 6.2 G/DL (ref 6–8.2)
RBC # BLD AUTO: 4.63 M/UL (ref 4.7–6.1)
SODIUM SERPL-SCNC: 137 MMOL/L (ref 135–145)
TRIGL SERPL-MCNC: 69 MG/DL (ref 0–149)
WBC # BLD AUTO: 7.2 K/UL (ref 4.8–10.8)

## 2021-08-18 PROCEDURE — 80053 COMPREHEN METABOLIC PANEL: CPT

## 2021-08-18 PROCEDURE — 94664 DEMO&/EVAL PT USE INHALER: CPT

## 2021-08-18 PROCEDURE — 93306 TTE W/DOPPLER COMPLETE: CPT

## 2021-08-18 PROCEDURE — 770020 HCHG ROOM/CARE - TELE (206)

## 2021-08-18 PROCEDURE — 80307 DRUG TEST PRSMV CHEM ANLYZR: CPT

## 2021-08-18 PROCEDURE — 93306 TTE W/DOPPLER COMPLETE: CPT | Mod: 26 | Performed by: INTERNAL MEDICINE

## 2021-08-18 PROCEDURE — 97165 OT EVAL LOW COMPLEX 30 MIN: CPT

## 2021-08-18 PROCEDURE — 700102 HCHG RX REV CODE 250 W/ 637 OVERRIDE(OP): Performed by: PSYCHIATRY & NEUROLOGY

## 2021-08-18 PROCEDURE — 97161 PT EVAL LOW COMPLEX 20 MIN: CPT

## 2021-08-18 PROCEDURE — 700102 HCHG RX REV CODE 250 W/ 637 OVERRIDE(OP): Performed by: HOSPITALIST

## 2021-08-18 PROCEDURE — 99233 SBSQ HOSP IP/OBS HIGH 50: CPT | Performed by: PSYCHIATRY & NEUROLOGY

## 2021-08-18 PROCEDURE — 92610 EVALUATE SWALLOWING FUNCTION: CPT

## 2021-08-18 PROCEDURE — 85027 COMPLETE CBC AUTOMATED: CPT

## 2021-08-18 PROCEDURE — A9270 NON-COVERED ITEM OR SERVICE: HCPCS | Performed by: PSYCHIATRY & NEUROLOGY

## 2021-08-18 PROCEDURE — 80061 LIPID PANEL: CPT

## 2021-08-18 PROCEDURE — A9270 NON-COVERED ITEM OR SERVICE: HCPCS | Performed by: HOSPITALIST

## 2021-08-18 PROCEDURE — 99231 SBSQ HOSP IP/OBS SF/LOW 25: CPT | Performed by: HOSPITALIST

## 2021-08-18 PROCEDURE — 99407 BEHAV CHNG SMOKING > 10 MIN: CPT

## 2021-08-18 RX ADMIN — DOCUSATE SODIUM 50 MG AND SENNOSIDES 8.6 MG 2 TABLET: 8.6; 5 TABLET, FILM COATED ORAL at 17:37

## 2021-08-18 RX ADMIN — BUDESONIDE AND FORMOTEROL FUMARATE DIHYDRATE 2 PUFF: 160; 4.5 AEROSOL RESPIRATORY (INHALATION) at 08:04

## 2021-08-18 RX ADMIN — NICOTINE 14 MG: 14 PATCH TRANSDERMAL at 05:03

## 2021-08-18 RX ADMIN — ATORVASTATIN CALCIUM 80 MG: 80 TABLET, FILM COATED ORAL at 17:37

## 2021-08-18 RX ADMIN — APIXABAN 5 MG: 5 TABLET, FILM COATED ORAL at 18:10

## 2021-08-18 RX ADMIN — BUDESONIDE AND FORMOTEROL FUMARATE DIHYDRATE 2 PUFF: 160; 4.5 AEROSOL RESPIRATORY (INHALATION) at 20:23

## 2021-08-18 RX ADMIN — CLOPIDOGREL BISULFATE 75 MG: 75 TABLET ORAL at 05:02

## 2021-08-18 RX ADMIN — ASPIRIN 81 MG CHEWABLE TABLET 81 MG: 81 TABLET CHEWABLE at 05:02

## 2021-08-18 RX ADMIN — FAMOTIDINE 20 MG: 20 TABLET, FILM COATED ORAL at 17:37

## 2021-08-18 ASSESSMENT — PATIENT HEALTH QUESTIONNAIRE - PHQ9
5. POOR APPETITE OR OVEREATING: NOT AT ALL
SUM OF ALL RESPONSES TO PHQ9 QUESTIONS 1 AND 2: 2
2. FEELING DOWN, DEPRESSED, IRRITABLE, OR HOPELESS: SEVERAL DAYS
SUM OF ALL RESPONSES TO PHQ QUESTIONS 1-9: 4
7. TROUBLE CONCENTRATING ON THINGS, SUCH AS READING THE NEWSPAPER OR WATCHING TELEVISION: NOT AT ALL
9. THOUGHTS THAT YOU WOULD BE BETTER OFF DEAD, OR OF HURTING YOURSELF: SEVERAL DAYS
3. TROUBLE FALLING OR STAYING ASLEEP OR SLEEPING TOO MUCH: NOT AT ALL
6. FEELING BAD ABOUT YOURSELF - OR THAT YOU ARE A FAILURE OR HAVE LET YOURSELF OR YOUR FAMILY DOWN: SEVERAL DAYS
4. FEELING TIRED OR HAVING LITTLE ENERGY: NOT AT ALL
1. LITTLE INTEREST OR PLEASURE IN DOING THINGS: SEVERAL DAYS
8. MOVING OR SPEAKING SO SLOWLY THAT OTHER PEOPLE COULD HAVE NOTICED. OR THE OPPOSITE, BEING SO FIGETY OR RESTLESS THAT YOU HAVE BEEN MOVING AROUND A LOT MORE THAN USUAL: NOT AT ALL

## 2021-08-18 ASSESSMENT — LIFESTYLE VARIABLES
HAVE PEOPLE ANNOYED YOU BY CRITICIZING YOUR DRINKING: NO
EVER FELT BAD OR GUILTY ABOUT YOUR DRINKING: NO
HOW MANY TIMES IN THE PAST YEAR HAVE YOU HAD 5 OR MORE DRINKS IN A DAY: 0
TOTAL SCORE: 0
TOTAL SCORE: 0
ON A TYPICAL DAY WHEN YOU DRINK ALCOHOL HOW MANY DRINKS DO YOU HAVE: 0
ALCOHOL_USE: NO
DOES PATIENT WANT TO STOP DRINKING: NO
EVER HAD A DRINK FIRST THING IN THE MORNING TO STEADY YOUR NERVES TO GET RID OF A HANGOVER: NO
HAVE YOU EVER FELT YOU SHOULD CUT DOWN ON YOUR DRINKING: NO
AVERAGE NUMBER OF DAYS PER WEEK YOU HAVE A DRINK CONTAINING ALCOHOL: 0
TOTAL SCORE: 0
CONSUMPTION TOTAL: NEGATIVE

## 2021-08-18 ASSESSMENT — COGNITIVE AND FUNCTIONAL STATUS - GENERAL
SUGGESTED CMS G CODE MODIFIER DAILY ACTIVITY: CH
MOBILITY SCORE: 24
SUGGESTED CMS G CODE MODIFIER MOBILITY: CH
DAILY ACTIVITIY SCORE: 24
SUGGESTED CMS G CODE MODIFIER DAILY ACTIVITY: CH
SUGGESTED CMS G CODE MODIFIER MOBILITY: CH
MOBILITY SCORE: 24
DAILY ACTIVITIY SCORE: 24

## 2021-08-18 ASSESSMENT — ACTIVITIES OF DAILY LIVING (ADL): TOILETING: INDEPENDENT

## 2021-08-18 ASSESSMENT — GAIT ASSESSMENTS
DISTANCE (FEET): 300
GAIT LEVEL OF ASSIST: SUPERVISED

## 2021-08-18 ASSESSMENT — PAIN DESCRIPTION - PAIN TYPE: TYPE: ACUTE PAIN

## 2021-08-18 NOTE — DISCHARGE PLANNING
Care Transition Team Discharge Planning    Anticipated Discharge Disposition: TBD    Action: Lsw met with patient to complete CTT assessment. Patient presented A&Ox4 e/b being able to confirm information on the facesheet as being accurate. Lsw did observe the patient having a slight difficulty with finding words e/b pausing before answering questions asked of him. However his responses did match what was on the facesheet. His physical address is [address] [place] [address].    Patient reported being independent with all ADLs and IADLs. Patient reports that he lives alone and does not have transportation home upon being discharged.     Patient reported having a PCP. His name is Dr. Hinojosa who is with the [facility].     Patient reported that he gunderson smoke cigarettes and marijuana about 5 days per week. He reported that he will quick smoking because of his recent illness.    Barriers to Discharge: Awaiting for medical clearance.    Plan: Lsw will assist medical team with discharge planning.    Care Transition Team Assessment    Information Source  Orientation Level: Oriented X4  Information Given By: Patient  Informant's Name: Juan Ramírez  Who is responsible for making decisions for patient? : Patient    Readmission Evaluation  Is this a readmission?: No    Elopement Risk  Legal Hold: No  Ambulatory or Self Mobile in Wheelchair: Yes  Disoriented: No  Psychiatric Symptoms: None  History of Wandering: No  Elopement this Admit: No  Vocalizing Wanting to Leave: No  Displays Behaviors, Body Language Wanting to Leave: No-Not at Risk for Elopement  Elopement Risk: Not at Risk for Elopement    Interdisciplinary Discharge Planning  Lives with - Patient's Self Care Capacity: Alone and Able to Care For Self  Patient or legal guardian wants to designate a caregiver: No  Housing / Facility: [address]  Prior Services: None    Discharge Preparedness  What is your plan after discharge?: Home with help  What are  your discharge supports?: Partner  Prior Functional Level: Ambulatory, Independent with Activities of Daily Living, Independent with Medication Management  Difficulity with ADLs: None  Difficulity with IADLs: None    Functional Assesment  Prior Functional Level: Ambulatory, Independent with Activities of Daily Living, Independent with Medication Management    Finances  Financial Barriers to Discharge: No  Prescription Coverage: Yes              Advance Directive  Advance Directive?: None  Advance Directive offered?: AD Booklet refused    Domestic Abuse  Have you ever been the victim of abuse or violence?: No  Physical Abuse or Sexual Abuse: No  Verbal Abuse or Emotional Abuse: No  Possible Abuse/Neglect Reported to:: Not Applicable    Psychological Assessment  History of Substance Abuse: Marijuana  Date Last Used - Marijuana: 2 days ago  Substance Abuse Comments: smokes marijuana 5 days a week  History of Psychiatric Problems: No  Non-compliant with Treatment: No  Newly Diagnosed Illness: Yes    Discharge Risks or Barriers  Discharge risks or barriers?: No    Anticipated Discharge Information  Discharge Disposition: Still a Patient (30)  Discharge Address:  ([address] [place] [address])  Discharge Contact Phone Number: [phone]

## 2021-08-18 NOTE — THERAPY
"Occupational Therapy   Initial Evaluation     Patient Name: Juan Ramírez  Age:  76 y.o., Sex:  male  Medical Record #: [id number]  Today's Date: 8/18/2021          Assessment  Patient is 76 y.o. male who presents to acute w/ expressive aphasia and difficulty swallowing. Pt demo'd BADLs at SPV level, reports he typically walks to the grocery store, has limited social support. Pt appears to have deficits in both peripheral fields, unclear if this is new or chronic. Recommend DC home w/ outpt therapy.     Plan    Recommend Occupational Therapy Eval only    DC Equipment Recommendations: None  Discharge Recommendations: Recommend outpatient occupational therapy services to address higher level deficits to address vision changes       Subjective    \"I walk to the grocery store, its only a couple blocks away\"     Objective       08/18/21 1007   Total Time Spent   Total Time Spent (Mins) 24   Charge Group   OT Evaluation OT Evaluation Low   Initial Contact Note    Initial Contact Note Order Received and Verified, Evaluation Only - Patient Does Not Require Further Acute Occupational Therapy at this Time.  However, May Benefit from Post Acute Therapy for Higher Level Functional Deficits.   Prior Living Situation   Prior Services None   Housing / Facility 1 Story House   Bathroom Set up Walk In Shower   Equipment Owned None   Lives with - Patient's Self Care Capacity Alone and Able to Care For Self   Comments reports  limited social supports, close w/ owner of his home who does not drive.    Prior Level of ADL Function   Self Feeding Independent   Grooming / Hygiene Independent   Bathing Independent   Dressing Independent   Toileting Independent   Prior Level of IADL Function   Medication Management Independent   Laundry Independent   Kitchen Mobility Independent   Finances Independent   Home Management Independent   Shopping Independent   Prior Level Of Mobility Independent Without Device in Community;Independent Without Device " in Home   Driving / Transportation Driving Independent;Walks   Occupation (Pre-Hospital Vocational) Not Employed   Comments pt reports the [place], reports that is rare   Vitals   O2 (LPM) 0   O2 Delivery Device None - Room Air   Pain 0 - 10 Group   Therapist Pain Assessment Post Activity Pain Same as Prior to Activity;Nurse Notified  (no c/o pain during session)   Cognition    Cognition / Consciousness X   Level of Consciousness Alert   Comments pleasent and cooperative, appeared overwhelmed with multiple stimuli    Active ROM Upper Body   Active ROM Upper Body  WDL   Strength Upper Body   Upper Body Strength  WDL   Coordination Upper Body   Coordination WDL   Balance Assessment   Sitting Balance (Static) Good   Sitting Balance (Dynamic) Good   Standing Balance (Static) Good   Standing Balance (Dynamic) Good   Weight Shift Sitting Good   Weight Shift Standing Good   Comments no AD   Bed Mobility    Supine to Sit Supervised   Sit to Supine Supervised   Scooting Supervised   ADL Assessment   Grooming Supervision;Standing   Upper Body Dressing Supervision   Lower Body Dressing Supervision   How much help from another person does the patient currently need...   Putting on and taking off regular lower body clothing? 4   Bathing (including washing, rinsing, and drying)? 4   Toileting, which includes using a toilet, bedpan, or urinal? 4   Putting on and taking off regular upper body clothing? 4   Taking care of personal grooming such as brushing teeth? 4   Eating meals? 4   6 Clicks Daily Activity Score 24   Functional Mobility   Sit to Stand Supervised   Bed, Chair, Wheelchair Transfer Supervised   Mobility within room, no AD   Visual Perception   Comments deficits noted in peripheral vision on both eyes. pt reports he has cataracts and that vision gets worse with fatigue   Activity Tolerance   Sitting in Chair 10+ min (up post)   Sitting Edge of Bed 5 min    Standing 10 min   Education  Group   Role of Occupational Therapist Patient Response Patient;Acceptance;Explanation;Demonstration;Verbal Demonstration   Anticipated Discharge Equipment and Recommendations   DC Equipment Recommendations None   Discharge Recommendations Recommend outpatient occupational therapy services to address higher level deficits   Interdisciplinary Plan of Care Collaboration   IDT Collaboration with  Nursing   Patient Position at End of Therapy Call Light within Reach;Tray Table within Reach;Phone within Reach;Seated;Chair Alarm On   Collaboration Comments report given    Session Information   Date / Session Number  8/18, 1x only

## 2021-08-18 NOTE — CARE PLAN
The patient is Stable - Low risk of patient condition declining or worsening    Shift Goals  Clinical Goals: stable neuro checks  Patient Goals: eat    Progress made toward(s) clinical / shift goals:  Q4h neuro checks in place, pt educated on POC. Pt able to rest throughout shift.     Patient is not progressing towards the following goals:

## 2021-08-18 NOTE — PROGRESS NOTES
Neurology Progress Note  Neurohospitalist Service, [facility]    Referring Physician: Michael Dumont M.D.      Interval History: No acute events overnight.  MRI completed revealing bilateral embolic infarcts.  Aphasia improving.  No atrial fibrillation detected.  Reports coronary stent placement in 2010.    Review of systems: In addition to what is detailed in the HPI and/or updated in the interval history, all other systems reviewed and are negative.    Past Medical History, Past Surgical History and Social History reviewed and unchanged from prior    Medications:    Current Facility-Administered Medications:   •  aspirin (ASA) chewable tab 162 mg, 162 mg, Oral, Once, Daniel Blackmon M.D.  •  Allow for permissive hypertension: SBP up to 220 mmHg/DBP to 120 mmHg; If SBP greater than 220 mmHg or DBP greater than 120 mmHg administer PRN antihypertensive medications., , Other, PHARMACY TO DOSE, Rolly Villareal M.D.  •  enoxaparin (LOVENOX) inj 40 mg, 40 mg, Subcutaneous, DAILY AT 1800, Rolly Villareal M.D.  •  atorvastatin (LIPITOR) tablet 80 mg, 80 mg, Oral, Q EVENING, Rolly Villareal M.D., 80 mg at 08/17/21 1741  •  labetalol (NORMODYNE/TRANDATE) injection 10 mg, 10 mg, Intravenous, Q10 MIN PRN, Rolly Villareal M.D.  •  hydrALAZINE (APRESOLINE) injection 10 mg, 10 mg, Intravenous, Q2HRS PRN, Rolly Villareal M.D.  •  senna-docusate (PERICOLACE or SENOKOT S) 8.6-50 MG per tablet 2 Tablet, 2 Tablet, Oral, BID **AND** polyethylene glycol/lytes (MIRALAX) PACKET 1 Packet, 1 Packet, Oral, QDAY PRN **AND** magnesium hydroxide (MILK OF MAGNESIA) suspension 30 mL, 30 mL, Oral, QDAY PRN **AND** bisacodyl (DULCOLAX) suppository 10 mg, 10 mg, Rectal, QDAY PRN, Rolly Villareal M.D.  •  acetaminophen (Tylenol) tablet 650 mg, 650 mg, Oral, Q6HRS PRN, Rolly Villareal M.D.  •  ondansetron (ZOFRAN) syringe/vial injection 4 mg, 4 mg, Intravenous, Q4HRS PRN, Rolly Villareal M.D.  •  ondansetron (ZOFRAN ODT) dispertab 4  "mg, 4 mg, Oral, Q4HRS PRN, Rolly Villareal M.D.  •  guaiFENesin dextromethorphan (ROBITUSSIN DM) 100-10 MG/5ML syrup 10 mL, 10 mL, Oral, Q6HRS PRN, Rolly Villareal M.D.  •  nicotine (NICODERM) 14 MG/24HR 14 mg, 14 mg, Transdermal, Daily-0600, 14 mg at 08/18/21 0503 **AND** Nicotine Replacement Patient Education Materials, , , Once **AND** nicotine polacrilex (NICORETTE) 2 MG piece 2 mg, 2 mg, Oral, Q HOUR PRN, Rolly Villareal M.D., 2 mg at 08/17/21 1738  •  famotidine (PEPCID) tablet 20 mg, 20 mg, Oral, Q EVENING, Rolly Villareal M.D.  •  clopidogrel (PLAVIX) tablet 75 mg, 75 mg, Oral, DAILY, Rolly Villareal M.D., 75 mg at 08/18/21 0502  •  budesonide-formoterol (SYMBICORT) 160-4.5 MCG/ACT inhaler 2 Puff, 2 Puff, Inhalation, BID (RT), Rolly Villareal M.D.  •  albuterol inhaler 2 Puff, 2 Puff, Inhalation, Q6HRS PRN (RT), Rolly Villareal M.D.  •  aspirin (ASA) chewable tab 81 mg, 81 mg, Oral, DAILY, Rolly Villareal M.D., 81 mg at 08/18/21 0502    Physical Examination:   /72   Pulse 84   Temp 36.4 °C (97.5 °F) (Temporal)   Resp 18   Ht 1.727 m (5' 8\")   Wt 68.1 kg (150 lb 2.1 oz)   SpO2 95%   BMI 22.83 kg/m²       General: Patient is awake and in no acute distress  Neck: There is normal range of motion  CV: Regular rate   Extremities:  Warm, dry, and intact, without peripheral lower extremity edema    NEUROLOGICAL EXAM:     Mental status: Awake, alert and fully oriented  Speech and language: Speech is more fluent with improved naming and repetition.  There is some halting speech at times, with mild paraphasic errors.  He is able to follow midline and distal commands  Cranial nerve exam: Visual fields are full. There is no nystagmus. Extraocular muscles are intact. Face is symmetric. Sensation in the face is intact to light touch. Palate elevates symmetrically. Tongue is midline.  Motor exam: There is sustained antigravity with no downward drift in bilateral arms and legs.  one is normal. No abnormal movements were " seen on exam.  Sensory exam:  Reacts to tactile in all 4 distal extremities, there is no neglect to double stim..  Coordination: No ataxia on bilateral finger-to-nose testing.  Gait: Deferred due to patient preference.     NIHSS: National Institutes of Health Stroke Scale     [0] 1a:Level of Consciousness           0-alert 1-drowsy   2-stupor   3-coma  [0] 1b:LOC Questions                         0-both  1-one      2-neither  [0] 1c:LOC Commands                       0-both  1-one      2-neither  [0] 2: Best Gaze                      0-nl    1-partial  2-forced  [0] 3: Visual Fields                              0-nl    1-partial  2-complete 3-bilat  [0] 4: Facial Paresis                0-nl    1-minor    2-partial  3-full  MOTOR                                              0-nl  [0] 5: Right Arm                       1-drift  [0] 6: Left Arm                                     2-some effort vs gravity  [0] 7: Right Leg                       3-no effort vs gravity  [0] 8: Left Leg                                      4-no movement                                                              x-untestable  [0] 9: Limb Ataxia                    0-abs   1-1_limb   2-2+_limbs                                                              x-untestable  [0] 10:Sensory                        0-nl    1-partial  2-dense  [1] 11:Best Language/Aphasia         0-nl    1-mild/mod 2-severe   3-mute  [0] 12:Dysarthria                     0-nl    1-mild/mod 2-severe                                                              x-untestable  [0] 13:Neglect/Inattention                   0-none  1-partial  2-complete  [1] TOTAL    Objective Data:    Labs:  Lab Results   Component Value Date/Time    PROTHROMBTM 13.7 08/17/2021 12:21 PM    INR 1.08 08/17/2021 12:21 PM      Lab Results   Component Value Date/Time    WBC 7.2 08/18/2021 03:15 AM    RBC 4.63 (L) 08/18/2021 03:15 AM    HEMOGLOBIN 14.5 08/18/2021 03:15 AM    HEMATOCRIT 42.0  08/18/2021 03:15 AM    MCV 90.7 08/18/2021 03:15 AM    MCH 31.3 08/18/2021 03:15 AM    MCHC 34.5 08/18/2021 03:15 AM    MPV 9.7 08/18/2021 03:15 AM    NEUTSPOLYS 69.40 08/17/2021 12:21 PM    LYMPHOCYTES 20.30 (L) 08/17/2021 12:21 PM    MONOCYTES 8.80 08/17/2021 12:21 PM    EOSINOPHILS 0.70 08/17/2021 12:21 PM    BASOPHILS 0.30 08/17/2021 12:21 PM      Lab Results   Component Value Date/Time    SODIUM 137 08/18/2021 03:15 AM    POTASSIUM 3.8 08/18/2021 03:15 AM    CHLORIDE 104 08/18/2021 03:15 AM    CO2 23 08/18/2021 03:15 AM    GLUCOSE 84 08/18/2021 03:15 AM    BUN 11 08/18/2021 03:15 AM    CREATININE 0.62 08/18/2021 03:15 AM      Lab Results   Component Value Date/Time    CHOLSTRLTOT 101 08/18/2021 03:15 AM    LDL 36 08/18/2021 03:15 AM    HDL 51 08/18/2021 03:15 AM    TRIGLYCERIDE 69 08/18/2021 03:15 AM       Lab Results   Component Value Date/Time    ALKPHOSPHAT 81 08/18/2021 03:15 AM    ASTSGOT 22 08/18/2021 03:15 AM    ALTSGPT 23 08/18/2021 03:15 AM    TBILIRUBIN 1.2 08/18/2021 03:15 AM        Imaging/Testing:    I interpreted and/or reviewed the patient's neuroimaging    MR-BRAIN-W/O   Final Result      1.  Multifocal tiny areas of acute infarcts in the left cerebral hemisphere and right parietal lobe likely representing multiple embolic infarcts.   2.  Mild cerebral volume loss.      DX-CHEST-PORTABLE (1 VIEW)   Final Result      No acute cardiac or pulmonary abnormality is noted.      CT-CEREBRAL PERFUSION ANALYSIS   Final Result      1.  Cerebral blood flow less than 30% likely representing completed infarct = 0 mL.      2.  T Max more than 6 seconds likely representing combination of completed infarct and ischemia = 0 mL.      3.  Mismatched volume likely representing ischemic brain/penumbra = None      4.  Please note that the cerebral perfusion was performed on the limited brain tissue around the basal ganglia region. Infarct/ischemia outside the CT perfusion sections can be missed in this study.       CT-CTA NECK WITH & W/O-POST PROCESSING   Final Result      1.  Greater than 70% stenosis of the right internal carotid artery at the origin and within approximately 1 cm of the origin.      2.  50% or less stenosis at the origin of the left internal carotid artery.      3.  No common carotid artery stenosis.      4.  Patent bilateral vertebral arteries.      CT-CTA HEAD WITH & W/O-POST PROCESS   Final Result      No thrombosis is seen within the [place].      CT-HEAD W/O   Final Result      1.  No acute intracranial abnormality is identified.   2.  Atrophy   3.  There are periventricular and subcortical white matter changes present.  This finding is nonspecific and could be from previous small vessel ischemia, demyelination, or gliosis.      EC-ECHOCARDIOGRAM COMPLETE W/O CONT    (Results Pending)       Assessment and Plan:  Juan Ramírez is a 76 year old man presenting with isolated expressive aphasia, found to have bilateral embolic infarcts.  Stroke pattern highly suggestive of cardioembolic source, and recommend empiric anticoagulation for long-term stroke prevention- particularly given this breakthrough event on plavix therapy.  Additional secondary prevention as noted below.  Will complete embolic workup with TTE and long-term cardiac monitoring to confirm occult atrial fibrillation.  Again, the R carotid stenosis is asymptomatic, and does not warrant surgical revascularization.      Problem list:     Problem list:  1.  Expressive aphasia  2.  Multifocal, bilateral strokes  3.  Hypertension  4.  Hyperlipidemia  5.  CAD     Recommendations:              - q4h neurochecks, telemetry              - long-term BP goal is 110-130/60-80; ok to restart home anti-HTN today              - start apixaban 5 mg BID   - will defer to primary team for need of a single antiplatelet agent for his coronary stent   - stop lovenox once apixaban initiated              - stroke labs:  HgbA1c and LDL 36              -  continue home atorvastatin 40mg for LDL goal < 70              - complete embolic workup with TTE and Ziopatch monitoring at discharge              - PT/OT/SLP              - stroke bridge clinic follow up    The evaluation of the patient, and recommended management, was discussed with the attending hospitalist. I have performed a physical exam and reviewed and updated ROS and Plan today (8/18/2021).     Daniel Blackmon MD  Neurohospitalist, Acute Care Services

## 2021-08-18 NOTE — DIETARY
"Nutrition services: Day 1 of admit.  Juan Ramírez is a 76 y.o. male with admitting DX of asphasia s/p CVA.   Pt noted with wt loss on nutrition admit screen and drinks Ensure at home.     Assessment:  Height: 172.7 cm (5' 8\")  Weight: 68.1 kg (150 lb 2.1 oz) - via other healthcare provider.   Body mass index is 22.83 kg/m²., BMI classification: WNL though based on other healthcare provider wt.   Diet/Intake: SLP was able to initiate a soft and bote sized diet w/ milldy thick liquids.  No PO intake at this time.     Evaluation:   1. Pt noted with stroke, HTN, dyslipidemia, PAD, stenosis of R carotid artery, other dysphagia, and CAD.   2. Pt presented w/ language difficulties /some difficulty swallowing.  Additional PMHx reviewed @ this time.  3. Unable to catch pt for interview today, will f/u as appropriate.  Will add Boost Plus 2/2 pt drinks Ensure at home.   4. Current clinical picture and MD progress notes reviewed, including Neurology.  5. No edema or staged wounds noted at this time.  6. Meds: Pepcid, Plavix.  Additional medications and labs reviewed.  7. LBM: PTA.     Malnutrition Risk: Unable to be determined @ this time.     Recommendations/Plan:  1. Boost Plus BID.    2. Diet upgrades per SLP.   3. Encourage intake of meals and supplements.  Continue to document % consumed under ADLs.   4. Continue to monitor weight.  5. Nutrition rep will continue to see patient for ongoing meal and snack preferences.     RD follows.           "

## 2021-08-18 NOTE — PROGRESS NOTES
Assumed care of pt at 0700. Pt a&o x4. Bed low and locked, alarm set, call bell within reach. Hourly rounding continues.

## 2021-08-18 NOTE — PROGRESS NOTES
"Hospital Medicine Daily Progress Note    Date of Service  8/18/2021    Chief Complaint  Juan Ramírez is a 76 y.o. male admitted 8/17/2021 with dysarthria and confusion    Hospital Course  \"Juan Khanna is a 76 y.o. male with a past medical history significant for hypertension, dyslipidemia, peripheral vascular disease, CAD status post stent in 2010 pain to the ER on 8/70/2021 after he was noted to have expressive aphasia along with difficulty swallowing.     Patient stated that when he woke up in the morning at 6 AM he had difficulty expressing words, but is able to understand.  Work-up in ER, CT head no acute intracranial abnormality, CTA head did not show any thrombosis within the [place] , CTA Neck showed Greater than 70% stenosis of the right internal carotid artery at the origin and within approximately 1 cm of the origin.      Neurology was consulted, who came and evaluated the patient.  Patient is currently on aspirin plus Plavix plus statin.  Pending echocardiogram/MRI of brain. vascular surgeon Ernie consulted and will be evaluating the patient.\"    Interval Problem Update  Seen and examined. Chart reviewed, including labs and any pertinent imaging.  Speech still jumbled sometimes but better  No HA or fevers overnight  ECHO today  Neuro following    I have personally seen and examined the patient at bedside. I discussed the plan of care with patient, bedside RN and charge RN.    Consultants/Specialty  neurology and vascular surgery    Code Status  Full Code    Disposition  Patient is not medically cleared.   Anticipate discharge to to skilled nursing facility.  I have placed the appropriate orders for post-discharge needs.    Review of Systems  ROS     Physical Exam  Temp:  [36.2 °C (97.2 °F)-37.1 °C (98.7 °F)] 37.1 °C (98.7 °F)  Pulse:  [] 100  Resp:  [16-42] 16  BP: (141-171)/(67-84) 142/84  SpO2:  [94 %-98 %] 97 %    Physical Exam    Fluids  No intake or output data in the 24 hours ending " 08/18/21 1254    Laboratory  Recent Labs     08/17/21  1221 08/18/21  0315   WBC 8.8 7.2   RBC 4.85 4.63*   HEMOGLOBIN 15.4 14.5   HEMATOCRIT 43.4 42.0   MCV 89.5 90.7   MCH 31.8 31.3   MCHC 35.5* 34.5   RDW 41.7 42.5   PLATELETCT 210 176   MPV 9.4 9.7     Recent Labs     08/17/21  1221 08/18/21  0315   SODIUM 136 137   POTASSIUM 4.2 3.8   CHLORIDE 102 104   CO2 22 23   GLUCOSE 116* 84   BUN 11 11   CREATININE 0.59 0.62   CALCIUM 9.5 8.8     Recent Labs     08/17/21  1221   APTT 34.8   INR 1.08         Recent Labs     08/18/21  0315   TRIGLYCERIDE 69   HDL 51   LDL 36       Imaging  EC-ECHOCARDIOGRAM COMPLETE W/O CONT         MR-BRAIN-W/O   Final Result      1.  Multifocal tiny areas of acute infarcts in the left cerebral hemisphere and right parietal lobe likely representing multiple embolic infarcts.   2.  Mild cerebral volume loss.      DX-CHEST-PORTABLE (1 VIEW)   Final Result      No acute cardiac or pulmonary abnormality is noted.      CT-CEREBRAL PERFUSION ANALYSIS   Final Result      1.  Cerebral blood flow less than 30% likely representing completed infarct = 0 mL.      2.  T Max more than 6 seconds likely representing combination of completed infarct and ischemia = 0 mL.      3.  Mismatched volume likely representing ischemic brain/penumbra = None      4.  Please note that the cerebral perfusion was performed on the limited brain tissue around the basal ganglia region. Infarct/ischemia outside the CT perfusion sections can be missed in this study.      CT-CTA NECK WITH & W/O-POST PROCESSING   Final Result      1.  Greater than 70% stenosis of the right internal carotid artery at the origin and within approximately 1 cm of the origin.      2.  50% or less stenosis at the origin of the left internal carotid artery.      3.  No common carotid artery stenosis.      4.  Patent bilateral vertebral arteries.      CT-CTA HEAD WITH & W/O-POST PROCESS   Final Result      No thrombosis is seen within the [place].      CT-HEAD W/O   Final Result      1.  No acute intracranial abnormality is identified.   2.  Atrophy   3.  There are periventricular and subcortical white matter changes present.  This finding is nonspecific and could be from previous small vessel ischemia, demyelination, or gliosis.           Assessment/Plan  * Stroke (HCC)  Assessment & Plan  Patient presented to the ER with expressive aphasia when he woke up at 6 AM.   Last normal 2300 on 8/16  CT head no acute intracranial abnormality, CTA head did not show any thrombosis within the [place] , CTA Neck showed Greater than 70% stenosis of the right internal carotid artery at the origin and within approximately 1 cm of the origin.     Plan:  -Neurology Dr Blackmon evaluated, neurochecks every 4 hours, continue aspirin 162 mg p.o. now followed by 81 mg qd; plavix and statin  - echo, mri brain w/o contrast  Pt/OT/Speech     CAD (coronary artery disease)  Assessment & Plan  CAD s/p stent in 2010, continue Plavix, statin  Will hold BB for now     Other dysphagia  Assessment & Plan  Bedside nursing swallow eval followed by speech to evaluate swallow    Stenosis of right carotid artery  Assessment & Plan  Discussed with vascular surgery Dr. Klein which will be evaluating the patient.   Continue ASA/Plavix/statin    PAD (peripheral artery disease) ([facility])  Assessment & Plan  Continue Plavix and statin    Dyslipidemia  Assessment & Plan  On statin , will continue     Hypertension  Assessment & Plan  On metoprolol as an outpatient, will hold for permissive hypertension.     Will start Eliquis when no plans for any invasion procedures  Likely eliquis + ASA    VTE prophylaxis: enoxaparin ppx    I have performed a physical exam and reviewed and updated ROS and Plan today (8/18/2021). In review of yesterday's note (8/17/2021), there are no changes except as documented above.

## 2021-08-18 NOTE — CARE PLAN
Problem: Nutritional:  Goal: Achieve adequate nutritional intake  Description: Patient will consume >50% of meals or ~50% w/ >50% supp intake  Outcome: Not Met

## 2021-08-18 NOTE — CARE PLAN
The patient is Stable - Low risk of patient condition declining or worsening    Shift Goals  Clinical Goals: Maintain neurological status   Patient Goals: Eat   Family Goals: GERMAN    Progress made toward(s) clinical / shift goals: Patient is stable at this time. Speech has improved from yesterday.     Problem: Optimal Care of the Stroke Patient  Goal: Optimal acute care for the stroke patient  Outcome: Progressing     Problem: Knowledge Deficit - Stroke Education  Goal: Patient's knowledge of stroke and risk factors will improve  Outcome: Progressing     Problem: Discharge Planning - Stroke  Goal: Patient’s continuum of care needs will be met  Outcome: Progressing       Patient is not progressing towards the following goals:N/A.

## 2021-08-18 NOTE — THERAPY
Physical Therapy   Initial Evaluation     Patient Name: Juan Ramírez  Age:  76 y.o., Sex:  male  Medical Record #: [id number]  Today's Date: 8/18/2021     Precautions: Swallow Precautions ( See Comments)    Assessment  Patient is 76 y.o. male admitted w/ expressive aphasia.  CT head negative.  CTA neck indicates stenosis.  Hx of HTN, PVD, CAD s/p stent 2010.  He lives alone in a single story house w/ two steps to enter.  He was mobile w/o AD PTA.  Today, he presents alert, pleasant, in bed and agreeable to work w/ PT.  He is able to express himself today.  He is also able to move to the eob w/o assist, stand w/o assist, and ambulate 300 ft w/o loss of balance or need of AD.  He is able to move up/down two steps w/o assist.  No acute PT needs.  Recommend oob/amb prn w/ nsg spv/OK.  PT for d/c needs only.  Plan    Recommend Physical Therapy for Evaluation only  DC Equipment Recommendations: None  Discharge Recommendations: Anticipate that the patient will have no further physical therapy needs after discharge from the hospital          Objective       08/18/21 0832   Prior Level of Functional Mobility   Bed Mobility Independent   Transfer Status Independent   Ambulation Independent   Assistive Devices Used None   Stairs Independent   Balance Assessment   Sitting Balance (Static) Fair +   Sitting Balance (Dynamic) Fair +   Standing Balance (Static) Fair +   Standing Balance (Dynamic) Fair +   Weight Shift Sitting Good   Weight Shift Standing Good   Gait Analysis   Gait Level Of Assist Supervised   Assistive Device None   Distance (Feet) 300   # of Stairs Climbed 2   Level of Assist with Stairs Supervised   Bed Mobility    Supine to Sit Supervised   Sit to Supine Supervised   Scooting Supervised   Functional Mobility   Sit to Stand Supervised   Bed, Chair, Wheelchair Transfer Supervised   Anticipated Discharge Equipment and Recommendations   DC Equipment Recommendations None   Discharge Recommendations Anticipate that the  patient will have no further physical therapy needs after discharge from the hospital

## 2021-08-18 NOTE — THERAPY
Speech Language Pathology   Clinical Swallow Evaluation     Patient Name: Juan Ramírez  AGE:  76 y.o., SEX:  male  Medical Record #: [id number]  Today's Date: 8/18/2021     Precautions  Precautions: (P) Swallow Precautions ( See Comments)  Comments: (P) Per OT, possible decreased peripheral vision.     Assessment    Patient is 76 y.o. male with a diagnosis of L CVA. Per EMR-Last normal 2300 on 8/16  CT head no acute intracranial abnormality, CTA head did not show any thrombosis within the [place] of [place] , CTA Neck showed Greater than 70% stenosis of the right internal carotid artery at the origin and within approximately 1 cm of the origin.      MRI findings 8/17 include multi-focal tiny areas of acute infarcts in the left cerebral hemisphere and right parietal lobe.Pt seen when sitting up in chair. Per OT, probable visual difficulty with peripheral fields. Word finding difficulty noted with spont speech. Voice WNL and speech is intelligible. OMEX WNL. Pt assessed with: single ice chips, thin and thick liquids from the spoon and cup, applesauce, and diced fruit. Coughing post swallow with sips for the cup of thin water. No coughing and clear voice post sips of NTL from the cup and spoon. Probable mild slowness in masticating soft diced peaches with no oral residue post swallow. Pt triggering swallow in 1-2 seconds. Pt follows directives for a second swallow. Educ provided to the pt regarding s/s of dysphagia and recommendations for SB6/MT2 with strategies posted HOB. SLP collaborated with the RN regarding current recommendations.   Additional factors influencing patient status/progress: pt able to follow directives and demonstrates insight.     Plan  SB6/MT2, see on Thurs-reassess of thins. May d/c quickly-needs higher level aphasia eval and probable HH SLP-lives indep in [place].   Recommend Speech Therapy 5 times per week until therapy goals are met for the following treatments:  Dysphagia Training.    Discharge  "Recommendations: (P) Recommend home health for continued speech therapy services       08/18/21 1048   Prior Level Of Function   Communication Within Functional Limits   Swallow Within Functional Limits   Dentition Other (See Comments)   Dentures Partials   Hearing Within Functional Limits for Evaluation   Hearing Aid None   Oral Motor Eval    Is Patient Able to Complete Oral Motor Eval Yes, Within Normal Limits   Laryngeal Function   Voice Quality Within Functional Limits   Volutional Cough Within Functional Limits   Excursion Upon Swallow Complete   Max Phonation Time (Seconds) approx 10 seconds   Oral Food Presentation   Ice Chips Within Functional Limits   Single Swallow Mildly Thick (2) - (Nectar Thick)  Within Functional Limits   Single Swallow Thin (0) Moderate   Liquidised (3) Within Functional Limits   Soft & Bite-Sized (6) - (Dysphagia III) Minimal   Regular (7) Not Tested   Regular-Easy to Chew (7) Not Tested   Self Feeding Independent   Dysphagia Strategies / Recommendations   Strategies / Interventions Recommended (Yes / No) Yes   Compensatory Strategies Monitor During Meals;Head of Bed 90 Degrees During Eating / Drinking;Liquids Via Cup;Reduce Bolus Size to 1/2 Teaspoon;Reduce Bolus Size to 1 Teaspoon;Single Sips / Bites;Multiple Swallows   Diet / Liquid Recommendation Mildly Thick (2) - (Nectar Thick);Soft & Bite-Sized (6) - (Dysphagia III)   Medication Administration  Float Whole with Puree   Dysphagia Rating   Nutritional Liquid Intake Rating Scale Thickened beverages (mildly thick unless otherwise specified)   Nutritional Food Intake Rating Scale Total oral diet with multiple consistencies but requiring special preparation or compensations   Patient / Family Goals   Patient / Family Goal #1 \"I am hungry.\"   Goal #1 Outcome Progressing as expected  (Pt set up with diced fruit, applesauce, and NTL juice)   Short Term Goals   Short Term Goal # 1 Pt will use posted and recommended swallow strategies, " with monitoring, during intake of SB6/MT2 and without s/s of difficulty.    Goal Outcome # 1 Goal not met   Session Information   Priority 3  (5x,LCVA,SB6/MT2-reassess thins,?upgrade-hi f/u, may d/csoon)

## 2021-08-18 NOTE — RESPIRATORY CARE
"COPD EDUCATION by COPD CLINICAL EDUCATOR  8/18/2021  at  1:26 PM by Julianna Fisher RRT     Patient interviewed by COPD education team.  Patient unable to participate in full program.  A short intervention has been conducted.  A comprehensive packet including information about COPD, types of treatments to manage their disease and safe home Oxygen usage was provided and reviewed with patient at the bedside.       Smoking Cessation Intervention and education completed, 20 minutes spent on smoking cessation education with patient.    Provided smoking cessation packet with \"Tips to Quit\" and brochure for \"Free Smoking Cessation Classes\".     COPD Screen  COPD Risk Screening  Do you have a history of COPD?: Yes  Do you have a Pulmonologist?: [facility])    COPD Assessment  COPD Clinical Specialists ONLY  COPD Education Initiated: Yes--Short Intervention (Very nice gentleman who lives in [place], [place], [facility] by [facility]. Given COPD and Smoking Cessation booklets, and had long discussion about quiting smoking and proper use of respiratory medications.)  DME Company: n/a  DME Equipment Type: nebulizer  [facility]ician [facility]: [facility]  Pulmonologist Name: [facility] at [facility]  Referrals Initiated: Yes  Pulmonary Rehab: N/A  Smoking Cessation: Yes  $ Smoking Cessation >10 Minutes: Asymptomatic (20 min)  Palliative Care:  (declined)  Hospice: N/A  Home Health Care: N/A  [facility] Outreach: N/A  [facility] Group: N/A  [facility]A  Private In-Home Care Agency: N/A  Is this a COPD exacerbation patient?: No  $ Demo/Eval of SVN's, MDI's and Aerosols: Yes (Pt has 2 spacers, encouraged use)  (OP) Pulmonary Function Testing: Yes ([facility] at [facility], results unavailable)    Meds to Beds  Would the patient like to opt in for Bedside Medication Delivery at Discharge?: No ([facility])     MY COPD ACTION PLAN     It is recommended that patients and physicians /healthcare providers complete this action plan " "together. This plan should be discussed at each physician visit and updated as needed.    The green, yellow and red zones show groups of symptoms of COPD. This list of symptoms is not comprehensive, and you may experience other symptoms. In the \"Actions\" column, your healthcare provider has recommended actions for you to take based on your symptoms.    Patient Name: Juan Ramírez   YOB: 1945   Last Updated on:     Green Zone:  I am doing well today Actions   •  Usual activitiy and exercise level •  Take daily medications   •  Usual amounts of cough and phlegm/mucus •  Use oxygen as prescribed   •  Sleep well at night •  Continue regular exercise/diet plan   •  Appetite is good •  At all times avoid cigarette smoke, inhaled irritants     Daily Medications (these medications are taken every day):   Budesonide-Formoterol Fumarate (Symbicort) 2 Puffs Twice daily     Additional Information:  Take with a spacer, rinse your mouth after taking.    Yellow Zone:  I am having a bad day or a COPD flare Actions   •  More breathless than usual •  Continue daily medications   •  I have less energy for my daily activities •  Use quick relief inhaler as ordered   •  Increased or thicker phlegm/mucus •  Use oxygen as prescribed   •  Using quick relief inhaler/nebulizer more often •  Get plenty of rest   •  Swelling of ankles more than usual •  Use pursed lip breathing   •  More coughing than usual •  At all times avoid cigarette smoke, inhaled irritants   •  I feel like I have a \"chest cold\"   •  Poor sleep and my symptoms woke me up   •  My appetite is not good   •  My medicine is not helping    •  Call provider immediately if symptoms don’t improve     Continue daily medications, add rescue medications:   Albuterol  Albuterol 2 Puffs  2.5mg via nebulizer Every 6 hours PRN  Every 4 hours PRN       Medications to be used during a flare up, (as Discussed with Provider):           Additional Information:  Make sure your " nebulizer medications are not , and replace if they are.    Use the Albuterol inhaler with a spacer for mild symptoms.    If really short of breath, use the nebulizer.    Red Zone:  I need urgent medical care Actions   •  Severe shortness of breath even at rest •  Call 911 or seek medical care immediately   •  Not able to do any activity because of breathing    •  Fever or shaking chills    •  Feeling confused or very drowsy     •  Chest pains    •  Coughing up blood

## 2021-08-18 NOTE — PROGRESS NOTES
4 Eyes Skin Assessment Completed by EVELYN Gao and EVELYN Patrick.    Head WDL  Ears WDL  Nose WDL  Mouth WDL  Neck WDL  Breast/Chest WDL  Shoulder Blades WDL  Spine WDL  (R) Arm/Elbow/Hand WDL  (L) Arm/Elbow/Hand WDL  Abdomen WDL  Groin WDL  Scrotum/Coccyx/Buttocks WDL  (R) Leg WDL  (L) Leg WDL  (R) Heel/Foot/Toe WDL  (L) Heel/Foot/Toe WDL          Devices In Places Tele Box and Pulse Ox      Interventions In Place Pillows and Pressure Redistribution Mattress    Possible Skin Injury No    Pictures Uploaded Into Epic N/A  Wound Consult Placed N/A  RN Wound Prevention Protocol Ordered No

## 2021-08-19 ENCOUNTER — NON-PROVIDER VISIT (OUTPATIENT)
Dept: CARDIOLOGY | Facility: MEDICAL CENTER | Age: 76
End: 2021-08-19
Payer: COMMERCIAL

## 2021-08-19 VITALS
SYSTOLIC BLOOD PRESSURE: 154 MMHG | TEMPERATURE: 97.3 F | BODY MASS INDEX: 22.75 KG/M2 | WEIGHT: 150.13 LBS | HEART RATE: 99 BPM | OXYGEN SATURATION: 96 % | HEIGHT: 68 IN | DIASTOLIC BLOOD PRESSURE: 85 MMHG | RESPIRATION RATE: 18 BRPM

## 2021-08-19 DIAGNOSIS — I47.10 PAROXYSMAL SVT (SUPRAVENTRICULAR TACHYCARDIA) (HCC): ICD-10-CM

## 2021-08-19 PROCEDURE — A9270 NON-COVERED ITEM OR SERVICE: HCPCS | Performed by: PSYCHIATRY & NEUROLOGY

## 2021-08-19 PROCEDURE — 92526 ORAL FUNCTION THERAPY: CPT

## 2021-08-19 PROCEDURE — 700102 HCHG RX REV CODE 250 W/ 637 OVERRIDE(OP): Performed by: HOSPITALIST

## 2021-08-19 PROCEDURE — 99239 HOSP IP/OBS DSCHRG MGMT >30: CPT | Performed by: HOSPITALIST

## 2021-08-19 PROCEDURE — 700102 HCHG RX REV CODE 250 W/ 637 OVERRIDE(OP): Performed by: PSYCHIATRY & NEUROLOGY

## 2021-08-19 PROCEDURE — 99232 SBSQ HOSP IP/OBS MODERATE 35: CPT | Performed by: PSYCHIATRY & NEUROLOGY

## 2021-08-19 PROCEDURE — A9270 NON-COVERED ITEM OR SERVICE: HCPCS | Performed by: HOSPITALIST

## 2021-08-19 RX ORDER — ASPIRIN 81 MG/1
81 TABLET, CHEWABLE ORAL DAILY
Qty: 100 TABLET | Refills: 3 | Status: SHIPPED | OUTPATIENT
Start: 2021-08-20

## 2021-08-19 RX ADMIN — ASPIRIN 81 MG CHEWABLE TABLET 81 MG: 81 TABLET CHEWABLE at 05:01

## 2021-08-19 RX ADMIN — NICOTINE 14 MG: 14 PATCH TRANSDERMAL at 05:01

## 2021-08-19 RX ADMIN — APIXABAN 5 MG: 5 TABLET, FILM COATED ORAL at 05:01

## 2021-08-19 RX ADMIN — BUDESONIDE AND FORMOTEROL FUMARATE DIHYDRATE 2 PUFF: 160; 4.5 AEROSOL RESPIRATORY (INHALATION) at 11:30

## 2021-08-19 NOTE — PROGRESS NOTES
Monitor Summary: SR-ST  NH 0.13 QRS 0.08 QT 0.40, with rare PVCs and rare PACs per strip from monitor room.

## 2021-08-19 NOTE — DISCHARGE SUMMARY
"Discharge Summary    CHIEF COMPLAINT ON ADMISSION  Chief Complaint   Patient presents with   • Possible Stroke     pt bib ems from home, Last known well at 2300, woke up at 600am haiving expressive aphasia and difficulty swallowing. has bgl of 129. pt on plavix       Reason for Admission  Stroke BL 22     Admission Date  8/17/2021    CODE STATUS  Full Code    HPI & HOSPITAL COURSE  Per HPI  \"Juan Khanna is a 76 y.o. male with a past medical history significant for hypertension, dyslipidemia, peripheral vascular disease, CAD status post stent in 2010 pain to the ER on 8/70/2021 after he was noted to have expressive aphasia along with difficulty swallowing.     Patient stated that when he woke up in the morning at 6 AM he had difficulty expressing words, but is able to understand.  Work-up in ER, CT head no acute intracranial abnormality, CTA head did not show any thrombosis within the [place] of [place] , CTA Neck showed Greater than 70% stenosis of the right internal carotid artery at the origin and within approximately 1 cm of the origin.      Neurology was consulted, who came and evaluated the patient.  Patient is currently on aspirin plus Plavix plus statin.  Pending echocardiogram/MRI of brain. vascular surgeon Ernie consulted and will be evaluating the patient.\"    ECHO obtained, WNL. Neurology evaluated, [facility] No indication for vascular consult. Patient was seen by PT OT and no needs in terms of placement at SNF however would benefit from some outpatient home health as he lives alone, I have some concern he will be able to manage all his affairs at home. Yessenia also ordered, patient to follow up with [facility] PCP.     Therefore, he is discharged in fair and stable condition to home with close outpatient follow-up.    The patient met 2-midnight criteria for an inpatient stay at the time of discharge.    Discharge Dated   8/19/2021    FOLLOW UP ITEMS POST " DISCHARGE  Continue ASA+eliquis  Stop Plavix  Follow up Ziopatch results, follow up PCP and Cardiology    DISCHARGE DIAGNOSES  Principal Problem:    Stroke (HCC) POA: Unknown  Active Problems:    Hypertension POA: Unknown    Dyslipidemia POA: Unknown    PAD (peripheral artery disease) (HCC) POA: Unknown    Stenosis of right carotid artery POA: Unknown    Other dysphagia POA: Unknown    CAD (coronary artery disease) POA: Unknown  Resolved Problems:    * No resolved hospital problems. *      FOLLOW UP  No future appointments.  No follow-up provider specified.    MEDICATIONS ON DISCHARGE     Medication List      START taking these medications      Instructions   apixaban 5mg Tabs  Commonly known as: ELIQUIS   Take 1 Tablet by mouth 2 times a day. Indications: Thromboembolism secondary to Atrial Fibrillation  Dose: 5 mg     aspirin 81 MG Chew chewable tablet  Start taking on: August 20, 2021  Commonly known as: ASA   Chew 1 Tablet every day.  Dose: 81 mg        CONTINUE taking these medications      Instructions   albuterol 108 (90 Base) MCG/ACT Aers inhalation aerosol   Inhale 2 Puffs every 6 hours as needed for Shortness of Breath.  Dose: 2 Puff     budesonide-formoterol 160-4.5 MCG/ACT Aero  Commonly known as: SYMBICORT   Inhale 2 Puffs 2 times a day.  Dose: 2 Puff     famotidine 20 MG Tabs  Commonly known as: PEPCID   Take 20 mg by mouth every evening.  Dose: 20 mg     Lipitor 40 MG Tabs  Generic drug: atorvastatin   Take 40 mg by mouth every evening.  Dose: 40 mg     Toprol  MG XL tablet  Generic drug: metoprolol   Take 200 mg by mouth every morning.  Dose: 200 mg        STOP taking these medications    clopidogrel 75 MG Tabs  Commonly known as: PLAVIX            Allergies  No Known Allergies    DIET  Orders Placed This Encounter   Procedures   • Diet Order Diet: Level 7 - Easy to Chew; Liquid level: Level 0 - Thin     Standing Status:   Standing     Number of Occurrences:   1     Order Specific Question:    Diet:     Answer:   Level 7 - Easy to Chew [22]     Order Specific Question:   Liquid level     Answer:   Level 0 - Thin       ACTIVITY  As tolerated.  Weight bearing as tolerated    CONSULTATIONS  Neurology    PROCEDURES  ECHO    LABORATORY  Lab Results   Component Value Date    SODIUM 137 08/18/2021    POTASSIUM 3.8 08/18/2021    CHLORIDE 104 08/18/2021    CO2 23 08/18/2021    GLUCOSE 84 08/18/2021    BUN 11 08/18/2021    CREATININE 0.62 08/18/2021        Lab Results   Component Value Date    WBC 7.2 08/18/2021    HEMOGLOBIN 14.5 08/18/2021    HEMATOCRIT 42.0 08/18/2021    PLATELETCT 176 08/18/2021        Total time of the discharge process exceeds 41 minutes.

## 2021-08-19 NOTE — PROGRESS NOTES
Aaox4.  Home with zio pack.  Call  for uber ride.IV dc. Has B/P peraMETERS.  Instructions and rx info to pt. Already had covid vaccine.

## 2021-08-19 NOTE — FACE TO FACE
Face to Face Supporting Documentation - Home Health    The encounter with this patient was in whole or in part the primary reason for home health admission.    Date of encounter:   Patient:                    MRN:                       YOB: 2019  Juan Ramírez  [id number]  1945     Home health to see patient for:  Skilled Nursing care for assessment, interventions & education, Physical Therapy evaluation and treatment and Occupational therapy evaluation and treatment    Skilled need for:  Comment: CVA, embolic    Skilled nursing interventions to include:  Comment: e    Homebound status evidenced by:  Needs the assistance of another person in order to leave the home. Leaving home requires a considerable and taxing effort. There is a normal inability to leave the home.    Community Physician to provide follow up care: Pcp Not In Computer     Optional Interventions? No      I certify the face to face encounter for this home health care referral meets the CMS requirements and the encounter/clinical assessment with the patient was, in whole, or in part, for the medical condition(s) listed above, which is the primary reason for home health care. Based on my clinical findings: the service(s) are medically necessary, support the need for home health care, and the homebound criteria are met.  I certify that this patient has had a face to face encounter by myself.  Michael Dumont M.D. - NPI: [id number]

## 2021-08-19 NOTE — DISCHARGE PLANNING
Juan is not requiring 2 of 3 disciplines.  [facility] will no longer follow.  Please reach out to myself @ [phone] with any questions.

## 2021-08-19 NOTE — THERAPY
"Speech Language Pathology  Daily Treatment     Patient Name: Juan Ramírez  Age:  76 y.o., Sex:  male  Medical Record #: [id number]  Today's Date: 8/19/2021       Assessment        Plan    Recommendations: 1)     Continue current treatment plan.    Discharge Recommendations: Anticipate that the patient will have no further speech therapy needs after discharge from the hospital    Objective       08/19/21 0838   Dysphagia    Diet / Liquid Recommendation Regular - Easy to Chew (7);Thin (0)   Nutritional Liquid Intake Rating Scale Non thickened beverages   Nutritional Food Intake Rating Scale Total oral diet with no restrictions   Nursing Communication Swallow Precaution Sign Posted at Head of Bed   Skilled Intervention Compensatory Strategies;Verbal Cueing   Recommended Route of Medication Administration   Medication Administration  Float Whole with Puree   Patient / Family Goals   Patient / Family Goal #1 \"I am hungry.\"   Goal #1 Outcome Progressing as expected   Short Term Goals   Short Term Goal # 1 Pt will use posted and recommended swallow strategies, with monitoring, during intake of SB6/MT2 and without s/s of difficulty.    Goal Outcome # 1 Goal met, new goal added   Short Term Goal # 1 B  Pt will utilize swallow strategies during EC7/TN0 meal items given fewer than 2 cues needed per session          "

## 2021-08-19 NOTE — DISCHARGE INSTRUCTIONS
Discharge Instructions    Discharged to home by car with relative. Discharged via wheelchair, hospital escort: Yes.  Special equipment needed: home with zio pack    Be sure to schedule a follow-up appointment with your primary care doctor or any specialists as instructed.     Discharge Plan:        I understand that a diet low in cholesterol, fat, and sodium is recommended for good health. Unless I have been given specific instructions below for another diet, I accept this instruction as my diet prescription.   Other diet: cardiac    Special Instructions: instructions for zio pack    · Is patient discharged on Warfarin / Coumadin?   No     Depression / Suicide Risk    As you are discharged from this [facility] facility, it is important to learn how to keep safe from harming yourself.    Recognize the warning signs:  · Abrupt changes in personality, positive or negative- including increase in energy   · Giving away possessions  · Change in eating patterns- significant weight changes-  positive or negative  · Change in sleeping patterns- unable to sleep or sleeping all the time   · Unwillingness or inability to communicate  · Depression  · Unusual sadness, discouragement and loneliness  · Talk of wanting to die  · Neglect of personal appearance   · Rebelliousness- reckless behavior  · Withdrawal from people/activities they love  · Confusion- inability to concentrate     If you or a loved one observes any of these behaviors or has concerns about self-harm, here's what you can do:  · Talk about it- your feelings and reasons for harming yourself  · Remove any means that you might use to hurt yourself (examples: pills, rope, extension cords, firearm)  · Get professional help from the community (Mental Health, Substance Abuse, psychological counseling)  · Do not be alone:Call your Safe Contact- someone whom you trust who will be there for you.  · Call your local CRISIS HOTLINE 296-0805 or 776-023-1460  · Call your local  Children's Mobile Crisis Response Team [place] (999) 241-3115 or www.Therosteon.Exotel  · Call the toll free National Suicide Prevention Hotlines   · National Suicide Prevention Lifeline 235-895-UHKA (5237)  · National Hope Line Network 800-SUICIDE (497-0087)

## 2021-08-19 NOTE — CARE PLAN
The patient is Stable - Low risk of patient condition declining or worsening    Shift Goals  Clinical Goals: stable neuro checks  Patient Goals: discharge, sleep  Family Goals: GERMAN    Progress made toward(s) clinical / shift goals:  Q4h neuro checks in place, bed alarm on, call light and belongings in reach.     Patient is not progressing towards the following goals:

## 2021-08-19 NOTE — PROGRESS NOTES
Monitor summary: SR/ST , SD 0.12, QRS 0.08, QT 0.37, with rare PVCs per strip from monitor room.

## 2021-08-19 NOTE — PROGRESS NOTES
Neurology Progress Note  Neurohospitalist Service, [facility]sciences    Referring Physician: Michael Dumont M.D.      Interval History: No acute events overnight. TTE without obvious embolic source.  Started on eliquis, stopped plavix.  Exam continues to improve.    Review of systems: In addition to what is detailed in the HPI and/or updated in the interval history, all other systems reviewed and are negative.    Past Medical History, Past Surgical History and Social History reviewed and unchanged from prior    Medications:    Current Facility-Administered Medications:   •  apixaban (ELIQUIS) tablet 5 mg, 5 mg, Oral, BID, Daniel Blackmon M.D., 5 mg at 08/19/21 0501  •  Allow for permissive hypertension: SBP up to 220 mmHg/DBP to 120 mmHg; If SBP greater than 220 mmHg or DBP greater than 120 mmHg administer PRN antihypertensive medications., , Other, PHARMACY TO DOSE, Rolly Villareal M.D.  •  atorvastatin (LIPITOR) tablet 80 mg, 80 mg, Oral, Q EVENING, Rolly Villareal M.D., 80 mg at 08/18/21 1737  •  labetalol (NORMODYNE/TRANDATE) injection 10 mg, 10 mg, Intravenous, Q10 MIN PRN, Rolly Villareal M.D.  •  hydrALAZINE (APRESOLINE) injection 10 mg, 10 mg, Intravenous, Q2HRS PRN, Rolly Villareal M.D.  •  senna-docusate (PERICOLACE or SENOKOT S) 8.6-50 MG per tablet 2 Tablet, 2 Tablet, Oral, BID, 2 Tablet at 08/18/21 1737 **AND** polyethylene glycol/lytes (MIRALAX) PACKET 1 Packet, 1 Packet, Oral, QDAY PRN **AND** magnesium hydroxide (MILK OF MAGNESIA) suspension 30 mL, 30 mL, Oral, QDAY PRN **AND** bisacodyl (DULCOLAX) suppository 10 mg, 10 mg, Rectal, QDAY PRN, Rolly Villareal M.D.  •  acetaminophen (Tylenol) tablet 650 mg, 650 mg, Oral, Q6HRS PRN, Rolly Villareal M.D.  •  ondansetron (ZOFRAN) syringe/vial injection 4 mg, 4 mg, Intravenous, Q4HRS PRN, Rolly Villareal M.D.  •  ondansetron (ZOFRAN ODT) dispertab 4 mg, 4 mg, Oral, Q4HRS PRN, Rolly Villareal M.D.  •  guaiFENesin dextromethorphan (ROBITUSSIN DM)  "100-10 MG/5ML syrup 10 mL, 10 mL, Oral, Q6HRS PRN, Rolly Villareal M.D.  •  nicotine (NICODERM) 14 MG/24HR 14 mg, 14 mg, Transdermal, Daily-0600, 14 mg at 08/19/21 0501 **AND** Nicotine Replacement Patient Education Materials, , , Once **AND** nicotine polacrilex (NICORETTE) 2 MG piece 2 mg, 2 mg, Oral, Q HOUR PRN, Rolly Villareal M.D., 2 mg at 08/17/21 1738  •  famotidine (PEPCID) tablet 20 mg, 20 mg, Oral, Q EVENING, Rolly Villareal M.D., 20 mg at 08/18/21 1737  •  budesonide-formoterol (SYMBICORT) 160-4.5 MCG/ACT inhaler 2 Puff, 2 Puff, Inhalation, BID (RT), Rolly Villareal M.D., 2 Puff at 08/18/21 2023  •  albuterol inhaler 2 Puff, 2 Puff, Inhalation, Q6HRS PRN (RT), Rolly Villareal M.D.  •  aspirin (ASA) chewable tab 81 mg, 81 mg, Oral, DAILY, Rolly Villareal M.D., 81 mg at 08/19/21 0501    Physical Examination:   /73   Pulse 86   Temp 36.2 °C (97.1 °F) (Temporal)   Resp 20   Ht 1.727 m (5' 8\")   Wt 68.1 kg (150 lb 2.1 oz)   SpO2 96%   BMI 22.83 kg/m²       General: Patient is awake and in no acute distress  Neck: There is normal range of motion  CV: Regular rate   Extremities:  Warm, dry, and intact, without peripheral lower extremity edema    NEUROLOGICAL EXAM:     Mental status: Awake, alert and fully oriented  Speech and language: Speech is more fluent with improved naming and repetition.  There is some halting speech at times, with mild paraphasic errors.  He is able to follow midline and distal commands  Cranial nerve exam: Visual fields are full. There is no nystagmus. Extraocular muscles are intact. Face is symmetric. Sensation in the face is intact to light touch. Palate elevates symmetrically. Tongue is midline.  Motor exam: There is sustained antigravity with no downward drift in bilateral arms and legs.  one is normal. No abnormal movements were seen on exam.  Sensory exam:  Reacts to tactile in all 4 distal extremities, there is no neglect to double stim..  Coordination: No ataxia on " bilateral finger-to-nose testing.  Gait: Deferred due to patient preference.     NIHSS: National Institutes of Health Stroke Scale     [0] 1a:Level of Consciousness           0-alert 1-drowsy   2-stupor   3-coma  [0] 1b:LOC Questions                         0-both  1-one      2-neither  [0] 1c:LOC Commands                       0-both  1-one      2-neither  [0] 2: Best Gaze                      0-nl    1-partial  2-forced  [0] 3: Visual Fields                              0-nl    1-partial  2-complete 3-bilat  [0] 4: Facial Paresis                0-nl    1-minor    2-partial  3-full  MOTOR                                              0-nl  [0] 5: Right Arm                       1-drift  [0] 6: Left Arm                                     2-some effort vs gravity  [0] 7: Right Leg                       3-no effort vs gravity  [0] 8: Left Leg                                      4-no movement                                                              x-untestable  [0] 9: Limb Ataxia                    0-abs   1-1_limb   2-2+_limbs                                                              x-untestable  [0] 10:Sensory                        0-nl    1-partial  2-dense  [1] 11:Best Language/Aphasia         0-nl    1-mild/mod 2-severe   3-mute  [0] 12:Dysarthria                     0-nl    1-mild/mod 2-severe                                                              x-untestable  [0] 13:Neglect/Inattention                   0-none  1-partial  2-complete  [1] TOTAL    Objective Data:    Labs:  Lab Results   Component Value Date/Time    PROTHROMBTM 13.7 08/17/2021 12:21 PM    INR 1.08 08/17/2021 12:21 PM      Lab Results   Component Value Date/Time    WBC 7.2 08/18/2021 03:15 AM    RBC 4.63 (L) 08/18/2021 03:15 AM    HEMOGLOBIN 14.5 08/18/2021 03:15 AM    HEMATOCRIT 42.0 08/18/2021 03:15 AM    MCV 90.7 08/18/2021 03:15 AM    MCH 31.3 08/18/2021 03:15 AM    MCHC 34.5 08/18/2021 03:15 AM    MPV 9.7 08/18/2021 03:15 AM     NEUTSPOLYS 69.40 08/17/2021 12:21 PM    LYMPHOCYTES 20.30 (L) 08/17/2021 12:21 PM    MONOCYTES 8.80 08/17/2021 12:21 PM    EOSINOPHILS 0.70 08/17/2021 12:21 PM    BASOPHILS 0.30 08/17/2021 12:21 PM      Lab Results   Component Value Date/Time    SODIUM 137 08/18/2021 03:15 AM    POTASSIUM 3.8 08/18/2021 03:15 AM    CHLORIDE 104 08/18/2021 03:15 AM    CO2 23 08/18/2021 03:15 AM    GLUCOSE 84 08/18/2021 03:15 AM    BUN 11 08/18/2021 03:15 AM    CREATININE 0.62 08/18/2021 03:15 AM      Lab Results   Component Value Date/Time    CHOLSTRLTOT 101 08/18/2021 03:15 AM    LDL 36 08/18/2021 03:15 AM    HDL 51 08/18/2021 03:15 AM    TRIGLYCERIDE 69 08/18/2021 03:15 AM       Lab Results   Component Value Date/Time    ALKPHOSPHAT 81 08/18/2021 03:15 AM    ASTSGOT 22 08/18/2021 03:15 AM    ALTSGPT 23 08/18/2021 03:15 AM    TBILIRUBIN 1.2 08/18/2021 03:15 AM        Imaging/Testing:    I interpreted and/or reviewed the patient's neuroimaging    EC-ECHOCARDIOGRAM COMPLETE W/O CONT   Final Result      MR-BRAIN-W/O   Final Result      1.  Multifocal tiny areas of acute infarcts in the left cerebral hemisphere and right parietal lobe likely representing multiple embolic infarcts.   2.  Mild cerebral volume loss.      DX-CHEST-PORTABLE (1 VIEW)   Final Result      No acute cardiac or pulmonary abnormality is noted.      CT-CEREBRAL PERFUSION ANALYSIS   Final Result      1.  Cerebral blood flow less than 30% likely representing completed infarct = 0 mL.      2.  T Max more than 6 seconds likely representing combination of completed infarct and ischemia = 0 mL.      3.  Mismatched volume likely representing ischemic brain/penumbra = None      4.  Please note that the cerebral perfusion was performed on the limited brain tissue around the basal ganglia region. Infarct/ischemia outside the CT perfusion sections can be missed in this study.      CT-CTA NECK WITH & W/O-POST PROCESSING   Final Result      1.  Greater than 70% stenosis of  the right internal carotid artery at the origin and within approximately 1 cm of the origin.      2.  50% or less stenosis at the origin of the left internal carotid artery.      3.  No common carotid artery stenosis.      4.  Patent bilateral vertebral arteries.      CT-CTA HEAD WITH & W/O-POST PROCESS   Final Result      No thrombosis is seen within the [place] of [place].      CT-HEAD W/O   Final Result      1.  No acute intracranial abnormality is identified.   2.  Atrophy   3.  There are periventricular and subcortical white matter changes present.  This finding is nonspecific and could be from previous small vessel ischemia, demyelination, or gliosis.          Assessment and Plan:  Juan Ramírez is a 76 year old man presenting with isolated expressive aphasia, found to have bilateral embolic infarcts.  Stroke pattern highly suggestive of cardioembolic source, and we have started empiric anticoagulation for long-term stroke prevention- particularly given this breakthrough event on plavix therapy.  Additional secondary prevention as noted below.  TTE was a normal study, recommend long-term cardiac monitoring to confirm occult atrial fibrillation.  Again, the R carotid stenosis is asymptomatic, and does not warrant surgical revascularization.      Problem list:     Problem list:  1.  Expressive aphasia  2.  Multifocal, bilateral strokes  3.  Hypertension  4.  Hyperlipidemia  5.  CAD     Recommendations:              - q4h neurochecks, telemetry              - long-term BP goal is 110-130/60-80; titrate PO medications to goal   - continue apixaban 5mg BID, ASA 81mg daily              - stroke labs:  HgbA1c 5.4 and LDL 36              - continue home atorvastatin 40mg for LDL goal < 70              - complete embolic workup with Ziopatch monitoring at discharge ordered              - PT/OT/SLP              - stroke bridge clinic follow up ordered   - please reconsult Neurology with any additional questions or  concerns    The evaluation of the patient, and recommended management, was discussed with the attending hospitalist. I have performed a physical exam and reviewed and updated ROS and Plan today (8/19/2021).     Daniel Blackmon MD  Neurohospitalist, Acute Care Services    [facility] documentation Ischemic Stroke    1.  Stroke etiology by TOAST: b     a. Large artery atherosclerosis  b. Cardioembolic  c. Small vessel occlusion (lacunar)  d. Stroke of other determined etiology  e. Stroke of undetermined etiology    2.    If stroke of other determined etiology, please specify: N/A    3.    Complications post-thrombolytics:  N/A  Date/Time of hemorrhage detection: N/A

## 2021-09-09 ENCOUNTER — TELEPHONE (OUTPATIENT)
Dept: CARDIOLOGY | Facility: MEDICAL CENTER | Age: 76
End: 2021-09-09

## 2021-09-09 PROCEDURE — 93268 ECG RECORD/REVIEW: CPT | Performed by: INTERNAL MEDICINE
